# Patient Record
Sex: MALE | Race: AMERICAN INDIAN OR ALASKA NATIVE | NOT HISPANIC OR LATINO | Employment: UNEMPLOYED | URBAN - METROPOLITAN AREA
[De-identification: names, ages, dates, MRNs, and addresses within clinical notes are randomized per-mention and may not be internally consistent; named-entity substitution may affect disease eponyms.]

---

## 2022-10-10 ENCOUNTER — MEDICAL CORRESPONDENCE (OUTPATIENT)
Dept: HEALTH INFORMATION MANAGEMENT | Facility: CLINIC | Age: 67
End: 2022-10-10

## 2022-10-10 ENCOUNTER — TRANSFERRED RECORDS (OUTPATIENT)
Dept: HEALTH INFORMATION MANAGEMENT | Facility: CLINIC | Age: 67
End: 2022-10-10

## 2022-10-13 ENCOUNTER — TRANSFERRED RECORDS (OUTPATIENT)
Dept: HEALTH INFORMATION MANAGEMENT | Facility: CLINIC | Age: 67
End: 2022-10-13

## 2022-10-21 ENCOUNTER — TRANSCRIBE ORDERS (OUTPATIENT)
Dept: OTHER | Age: 67
End: 2022-10-21

## 2022-10-21 DIAGNOSIS — M25.552 HIP PAIN, LEFT: Primary | ICD-10-CM

## 2023-09-27 RX ORDER — GABAPENTIN 100 MG/1
100 CAPSULE ORAL 3 TIMES DAILY
Status: ON HOLD | COMMUNITY
End: 2023-10-02

## 2023-10-02 ENCOUNTER — HOSPITAL ENCOUNTER (INPATIENT)
Facility: CLINIC | Age: 68
LOS: 4 days | Discharge: HOME HEALTH | DRG: 471 | End: 2023-10-06
Attending: ORTHOPAEDIC SURGERY | Admitting: ORTHOPAEDIC SURGERY
Payer: COMMERCIAL

## 2023-10-02 ENCOUNTER — APPOINTMENT (OUTPATIENT)
Dept: RADIOLOGY | Facility: CLINIC | Age: 68
DRG: 471 | End: 2023-10-02
Attending: ORTHOPAEDIC SURGERY
Payer: COMMERCIAL

## 2023-10-02 ENCOUNTER — ANESTHESIA EVENT (OUTPATIENT)
Dept: SURGERY | Facility: CLINIC | Age: 68
DRG: 471 | End: 2023-10-02
Payer: COMMERCIAL

## 2023-10-02 ENCOUNTER — APPOINTMENT (OUTPATIENT)
Dept: PHYSICAL THERAPY | Facility: CLINIC | Age: 68
DRG: 471 | End: 2023-10-02
Attending: PHYSICIAN ASSISTANT
Payer: COMMERCIAL

## 2023-10-02 ENCOUNTER — ANESTHESIA (OUTPATIENT)
Dept: SURGERY | Facility: CLINIC | Age: 68
DRG: 471 | End: 2023-10-02
Payer: COMMERCIAL

## 2023-10-02 DIAGNOSIS — J69.0 ASPIRATION PNEUMONIA OF LEFT LOWER LOBE, UNSPECIFIED ASPIRATION PNEUMONIA TYPE (H): ICD-10-CM

## 2023-10-02 DIAGNOSIS — I10 PRIMARY HYPERTENSION: ICD-10-CM

## 2023-10-02 DIAGNOSIS — M48.061 SPINAL STENOSIS OF LUMBAR REGION, UNSPECIFIED WHETHER NEUROGENIC CLAUDICATION PRESENT: Primary | ICD-10-CM

## 2023-10-02 PROBLEM — F10.90 ALCOHOL USE, UNSPECIFIED, UNCOMPLICATED: Status: ACTIVE | Noted: 2023-10-02

## 2023-10-02 LAB
ALBUMIN SERPL BCG-MCNC: 4.1 G/DL (ref 3.5–5.2)
ALP SERPL-CCNC: 91 U/L (ref 40–129)
ALT SERPL W P-5'-P-CCNC: 12 U/L (ref 0–70)
ANION GAP SERPL CALCULATED.3IONS-SCNC: 8 MMOL/L (ref 7–15)
AST SERPL W P-5'-P-CCNC: 27 U/L (ref 0–45)
BILIRUB DIRECT SERPL-MCNC: <0.2 MG/DL (ref 0–0.3)
BILIRUB SERPL-MCNC: 0.2 MG/DL
BUN SERPL-MCNC: 16.3 MG/DL (ref 8–23)
CALCIUM SERPL-MCNC: 8.4 MG/DL (ref 8.8–10.2)
CHLORIDE SERPL-SCNC: 103 MMOL/L (ref 98–107)
CREAT SERPL-MCNC: 0.72 MG/DL (ref 0.67–1.17)
DEPRECATED HCO3 PLAS-SCNC: 28 MMOL/L (ref 22–29)
EGFRCR SERPLBLD CKD-EPI 2021: >90 ML/MIN/1.73M2
GLUCOSE SERPL-MCNC: 159 MG/DL (ref 70–99)
POTASSIUM SERPL-SCNC: 4.1 MMOL/L (ref 3.4–5.3)
PROT SERPL-MCNC: 7.4 G/DL (ref 6.4–8.3)
SODIUM SERPL-SCNC: 139 MMOL/L (ref 135–145)
TSH SERPL DL<=0.005 MIU/L-ACNC: 3.29 UIU/ML (ref 0.3–4.2)

## 2023-10-02 PROCEDURE — 250N000013 HC RX MED GY IP 250 OP 250 PS 637: Performed by: FAMILY MEDICINE

## 2023-10-02 PROCEDURE — 84443 ASSAY THYROID STIM HORMONE: CPT | Performed by: FAMILY MEDICINE

## 2023-10-02 PROCEDURE — 258N000003 HC RX IP 258 OP 636: Performed by: ANESTHESIOLOGY

## 2023-10-02 PROCEDURE — 82248 BILIRUBIN DIRECT: CPT | Performed by: FAMILY MEDICINE

## 2023-10-02 PROCEDURE — 272N000001 HC OR GENERAL SUPPLY STERILE: Performed by: ORTHOPAEDIC SURGERY

## 2023-10-02 PROCEDURE — 250N000009 HC RX 250: Performed by: ANESTHESIOLOGY

## 2023-10-02 PROCEDURE — 0RG20A0 FUSION OF 2 OR MORE CERVICAL VERTEBRAL JOINTS WITH INTERBODY FUSION DEVICE, ANTERIOR APPROACH, ANTERIOR COLUMN, OPEN APPROACH: ICD-10-PCS | Performed by: ORTHOPAEDIC SURGERY

## 2023-10-02 PROCEDURE — 80053 COMPREHEN METABOLIC PANEL: CPT | Performed by: FAMILY MEDICINE

## 2023-10-02 PROCEDURE — 97530 THERAPEUTIC ACTIVITIES: CPT | Mod: GP

## 2023-10-02 PROCEDURE — 258N000003 HC RX IP 258 OP 636: Performed by: PHYSICIAN ASSISTANT

## 2023-10-02 PROCEDURE — 250N000011 HC RX IP 250 OP 636: Performed by: ANESTHESIOLOGY

## 2023-10-02 PROCEDURE — 999N000182 XR SURGERY CARM FLUORO GREATER THAN 5 MIN: Mod: TC

## 2023-10-02 PROCEDURE — 97161 PT EVAL LOW COMPLEX 20 MIN: CPT | Mod: GP

## 2023-10-02 PROCEDURE — 97116 GAIT TRAINING THERAPY: CPT | Mod: GP

## 2023-10-02 PROCEDURE — 250N000011 HC RX IP 250 OP 636: Mod: JZ | Performed by: PHYSICIAN ASSISTANT

## 2023-10-02 PROCEDURE — 36415 COLL VENOUS BLD VENIPUNCTURE: CPT | Performed by: FAMILY MEDICINE

## 2023-10-02 PROCEDURE — 250N000013 HC RX MED GY IP 250 OP 250 PS 637: Performed by: ANESTHESIOLOGY

## 2023-10-02 PROCEDURE — 99222 1ST HOSP IP/OBS MODERATE 55: CPT | Performed by: FAMILY MEDICINE

## 2023-10-02 PROCEDURE — C1713 ANCHOR/SCREW BN/BN,TIS/BN: HCPCS | Performed by: ORTHOPAEDIC SURGERY

## 2023-10-02 PROCEDURE — 360N000085 HC SURGERY LEVEL 5 W/ FLUORO, PER MIN: Performed by: ORTHOPAEDIC SURGERY

## 2023-10-02 PROCEDURE — 01N10ZZ RELEASE CERVICAL NERVE, OPEN APPROACH: ICD-10-PCS | Performed by: ORTHOPAEDIC SURGERY

## 2023-10-02 PROCEDURE — C1762 CONN TISS, HUMAN(INC FASCIA): HCPCS | Performed by: ORTHOPAEDIC SURGERY

## 2023-10-02 PROCEDURE — 120N000004 HC R&B MS OVERFLOW

## 2023-10-02 PROCEDURE — 250N000009 HC RX 250: Performed by: ORTHOPAEDIC SURGERY

## 2023-10-02 PROCEDURE — 250N000011 HC RX IP 250 OP 636: Performed by: PHYSICIAN ASSISTANT

## 2023-10-02 PROCEDURE — 250N000013 HC RX MED GY IP 250 OP 250 PS 637: Performed by: PHYSICIAN ASSISTANT

## 2023-10-02 PROCEDURE — 370N000017 HC ANESTHESIA TECHNICAL FEE, PER MIN: Performed by: ORTHOPAEDIC SURGERY

## 2023-10-02 PROCEDURE — 999N000141 HC STATISTIC PRE-PROCEDURE NURSING ASSESSMENT: Performed by: ORTHOPAEDIC SURGERY

## 2023-10-02 PROCEDURE — 00NW0ZZ RELEASE CERVICAL SPINAL CORD, OPEN APPROACH: ICD-10-PCS | Performed by: ORTHOPAEDIC SURGERY

## 2023-10-02 PROCEDURE — 250N000025 HC SEVOFLURANE, PER MIN: Performed by: ORTHOPAEDIC SURGERY

## 2023-10-02 PROCEDURE — 0RT30ZZ RESECTION OF CERVICAL VERTEBRAL DISC, OPEN APPROACH: ICD-10-PCS | Performed by: ORTHOPAEDIC SURGERY

## 2023-10-02 PROCEDURE — 710N000010 HC RECOVERY PHASE 1, LEVEL 2, PER MIN: Performed by: ORTHOPAEDIC SURGERY

## 2023-10-02 DEVICE — IMPLANTABLE DEVICE: Type: IMPLANTABLE DEVICE | Site: SPINE CERVICAL | Status: FUNCTIONAL

## 2023-10-02 DEVICE — ROI-C ANCHORING PLATE
Type: IMPLANTABLE DEVICE | Site: SPINE CERVICAL | Status: FUNCTIONAL
Brand: ROI-C

## 2023-10-02 DEVICE — GRAFT ALLOGRAFT ARTHREX ALLOSYNC DBM PASTE 1ML ABS-2015-01: Type: IMPLANTABLE DEVICE | Site: SPINE CERVICAL | Status: FUNCTIONAL

## 2023-10-02 DEVICE — STERILE SHORT STARTER AWL FOR ROI-C ANCHORING PLATE
Type: IMPLANTABLE DEVICE | Site: SPINE CERVICAL | Status: FUNCTIONAL
Brand: ROI-C

## 2023-10-02 RX ORDER — FENTANYL CITRATE 50 UG/ML
INJECTION, SOLUTION INTRAMUSCULAR; INTRAVENOUS PRN
Status: DISCONTINUED | OUTPATIENT
Start: 2023-10-02 | End: 2023-10-02

## 2023-10-02 RX ORDER — HYDROMORPHONE HCL IN WATER/PF 6 MG/30 ML
0.2 PATIENT CONTROLLED ANALGESIA SYRINGE INTRAVENOUS EVERY 5 MIN PRN
Status: DISCONTINUED | OUTPATIENT
Start: 2023-10-02 | End: 2023-10-02 | Stop reason: HOSPADM

## 2023-10-02 RX ORDER — SODIUM CHLORIDE, SODIUM LACTATE, POTASSIUM CHLORIDE, CALCIUM CHLORIDE 600; 310; 30; 20 MG/100ML; MG/100ML; MG/100ML; MG/100ML
INJECTION, SOLUTION INTRAVENOUS CONTINUOUS
Status: DISCONTINUED | OUTPATIENT
Start: 2023-10-02 | End: 2023-10-02 | Stop reason: HOSPADM

## 2023-10-02 RX ORDER — CEFAZOLIN SODIUM/WATER 2 G/20 ML
2 SYRINGE (ML) INTRAVENOUS SEE ADMIN INSTRUCTIONS
Status: DISCONTINUED | OUTPATIENT
Start: 2023-10-02 | End: 2023-10-02 | Stop reason: HOSPADM

## 2023-10-02 RX ORDER — SODIUM CHLORIDE 9 MG/ML
INJECTION, SOLUTION INTRAVENOUS CONTINUOUS
Status: DISCONTINUED | OUTPATIENT
Start: 2023-10-02 | End: 2023-10-03

## 2023-10-02 RX ORDER — PROPOFOL 10 MG/ML
INJECTION, EMULSION INTRAVENOUS PRN
Status: DISCONTINUED | OUTPATIENT
Start: 2023-10-02 | End: 2023-10-02

## 2023-10-02 RX ORDER — LABETALOL HYDROCHLORIDE 5 MG/ML
10 INJECTION, SOLUTION INTRAVENOUS
Status: COMPLETED | OUTPATIENT
Start: 2023-10-02 | End: 2023-10-02

## 2023-10-02 RX ORDER — CEFAZOLIN SODIUM/WATER 2 G/20 ML
2 SYRINGE (ML) INTRAVENOUS
Status: COMPLETED | OUTPATIENT
Start: 2023-10-02 | End: 2023-10-02

## 2023-10-02 RX ORDER — ONDANSETRON 2 MG/ML
4 INJECTION INTRAMUSCULAR; INTRAVENOUS EVERY 6 HOURS PRN
Status: DISCONTINUED | OUTPATIENT
Start: 2023-10-02 | End: 2023-10-02

## 2023-10-02 RX ORDER — GABAPENTIN 100 MG/1
100 CAPSULE ORAL
Status: COMPLETED | OUTPATIENT
Start: 2023-10-02 | End: 2023-10-02

## 2023-10-02 RX ORDER — LIDOCAINE 40 MG/G
CREAM TOPICAL
Status: DISCONTINUED | OUTPATIENT
Start: 2023-10-02 | End: 2023-10-02 | Stop reason: HOSPADM

## 2023-10-02 RX ORDER — NALOXONE HYDROCHLORIDE 0.4 MG/ML
0.4 INJECTION, SOLUTION INTRAMUSCULAR; INTRAVENOUS; SUBCUTANEOUS
Status: DISCONTINUED | OUTPATIENT
Start: 2023-10-02 | End: 2023-10-06 | Stop reason: HOSPADM

## 2023-10-02 RX ORDER — HYDROMORPHONE HCL IN WATER/PF 6 MG/30 ML
0.2 PATIENT CONTROLLED ANALGESIA SYRINGE INTRAVENOUS
Status: DISCONTINUED | OUTPATIENT
Start: 2023-10-02 | End: 2023-10-06 | Stop reason: HOSPADM

## 2023-10-02 RX ORDER — ACETAMINOPHEN 325 MG/1
650 TABLET ORAL EVERY 4 HOURS PRN
Status: DISCONTINUED | OUTPATIENT
Start: 2023-10-05 | End: 2023-10-06 | Stop reason: HOSPADM

## 2023-10-02 RX ORDER — NALOXONE HYDROCHLORIDE 0.4 MG/ML
0.2 INJECTION, SOLUTION INTRAMUSCULAR; INTRAVENOUS; SUBCUTANEOUS
Status: DISCONTINUED | OUTPATIENT
Start: 2023-10-02 | End: 2023-10-06 | Stop reason: HOSPADM

## 2023-10-02 RX ORDER — ONDANSETRON 4 MG/1
4 TABLET, ORALLY DISINTEGRATING ORAL EVERY 30 MIN PRN
Status: DISCONTINUED | OUTPATIENT
Start: 2023-10-02 | End: 2023-10-06 | Stop reason: HOSPADM

## 2023-10-02 RX ORDER — CEFAZOLIN SODIUM 2 G/100ML
2 INJECTION, SOLUTION INTRAVENOUS EVERY 8 HOURS
Status: COMPLETED | OUTPATIENT
Start: 2023-10-02 | End: 2023-10-03

## 2023-10-02 RX ORDER — LORATADINE 10 MG/1
10 TABLET ORAL DAILY PRN
Status: DISCONTINUED | OUTPATIENT
Start: 2023-10-02 | End: 2023-10-06 | Stop reason: HOSPADM

## 2023-10-02 RX ORDER — ACETAMINOPHEN 325 MG/1
975 TABLET ORAL EVERY 8 HOURS
Status: COMPLETED | OUTPATIENT
Start: 2023-10-02 | End: 2023-10-05

## 2023-10-02 RX ORDER — BISACODYL 10 MG
10 SUPPOSITORY, RECTAL RECTAL DAILY PRN
Status: DISCONTINUED | OUTPATIENT
Start: 2023-10-02 | End: 2023-10-06 | Stop reason: HOSPADM

## 2023-10-02 RX ORDER — POLYETHYLENE GLYCOL 3350 17 G/17G
17 POWDER, FOR SOLUTION ORAL DAILY
Status: DISCONTINUED | OUTPATIENT
Start: 2023-10-03 | End: 2023-10-06 | Stop reason: HOSPADM

## 2023-10-02 RX ORDER — ONDANSETRON 4 MG/1
4 TABLET, ORALLY DISINTEGRATING ORAL EVERY 6 HOURS PRN
Status: DISCONTINUED | OUTPATIENT
Start: 2023-10-02 | End: 2023-10-02

## 2023-10-02 RX ORDER — PROCHLORPERAZINE MALEATE 5 MG
5 TABLET ORAL EVERY 6 HOURS PRN
Status: DISCONTINUED | OUTPATIENT
Start: 2023-10-02 | End: 2023-10-06 | Stop reason: HOSPADM

## 2023-10-02 RX ORDER — HYDROMORPHONE HCL IN WATER/PF 6 MG/30 ML
0.4 PATIENT CONTROLLED ANALGESIA SYRINGE INTRAVENOUS EVERY 5 MIN PRN
Status: DISCONTINUED | OUTPATIENT
Start: 2023-10-02 | End: 2023-10-02 | Stop reason: HOSPADM

## 2023-10-02 RX ORDER — HYDROXYZINE HYDROCHLORIDE 10 MG/1
10 TABLET, FILM COATED ORAL EVERY 6 HOURS PRN
Status: DISCONTINUED | OUTPATIENT
Start: 2023-10-02 | End: 2023-10-06 | Stop reason: HOSPADM

## 2023-10-02 RX ORDER — DIMENHYDRINATE 50 MG/ML
25 INJECTION, SOLUTION INTRAMUSCULAR; INTRAVENOUS
Status: DISCONTINUED | OUTPATIENT
Start: 2023-10-02 | End: 2023-10-02 | Stop reason: HOSPADM

## 2023-10-02 RX ORDER — DIPHENHYDRAMINE HCL 12.5 MG/5ML
12.5 SOLUTION ORAL EVERY 6 HOURS PRN
Status: DISCONTINUED | OUTPATIENT
Start: 2023-10-02 | End: 2023-10-02 | Stop reason: HOSPADM

## 2023-10-02 RX ORDER — ONDANSETRON 2 MG/ML
4 INJECTION INTRAMUSCULAR; INTRAVENOUS EVERY 30 MIN PRN
Status: DISCONTINUED | OUTPATIENT
Start: 2023-10-02 | End: 2023-10-02 | Stop reason: HOSPADM

## 2023-10-02 RX ORDER — ONDANSETRON 2 MG/ML
4 INJECTION INTRAMUSCULAR; INTRAVENOUS EVERY 30 MIN PRN
Status: DISCONTINUED | OUTPATIENT
Start: 2023-10-02 | End: 2023-10-06 | Stop reason: HOSPADM

## 2023-10-02 RX ORDER — LIDOCAINE HYDROCHLORIDE 10 MG/ML
INJECTION, SOLUTION INFILTRATION; PERINEURAL PRN
Status: DISCONTINUED | OUTPATIENT
Start: 2023-10-02 | End: 2023-10-02

## 2023-10-02 RX ORDER — AMOXICILLIN 250 MG
1 CAPSULE ORAL 2 TIMES DAILY
Status: DISCONTINUED | OUTPATIENT
Start: 2023-10-02 | End: 2023-10-06 | Stop reason: HOSPADM

## 2023-10-02 RX ORDER — FENTANYL CITRATE 50 UG/ML
50 INJECTION, SOLUTION INTRAMUSCULAR; INTRAVENOUS EVERY 5 MIN PRN
Status: DISCONTINUED | OUTPATIENT
Start: 2023-10-02 | End: 2023-10-02 | Stop reason: HOSPADM

## 2023-10-02 RX ORDER — HALOPERIDOL 5 MG/ML
1 INJECTION INTRAMUSCULAR
Status: DISCONTINUED | OUTPATIENT
Start: 2023-10-02 | End: 2023-10-02 | Stop reason: HOSPADM

## 2023-10-02 RX ORDER — OXYCODONE HYDROCHLORIDE 5 MG/1
5 TABLET ORAL
Status: DISCONTINUED | OUTPATIENT
Start: 2023-10-02 | End: 2023-10-06 | Stop reason: HOSPADM

## 2023-10-02 RX ORDER — LISINOPRIL 5 MG/1
5 TABLET ORAL DAILY
Status: DISCONTINUED | OUTPATIENT
Start: 2023-10-02 | End: 2023-10-06 | Stop reason: HOSPADM

## 2023-10-02 RX ORDER — KETAMINE HYDROCHLORIDE 10 MG/ML
INJECTION INTRAMUSCULAR; INTRAVENOUS PRN
Status: DISCONTINUED | OUTPATIENT
Start: 2023-10-02 | End: 2023-10-02

## 2023-10-02 RX ORDER — MULTIVITAMIN,THERAPEUTIC
1 TABLET ORAL DAILY
Status: DISCONTINUED | OUTPATIENT
Start: 2023-10-03 | End: 2023-10-06 | Stop reason: HOSPADM

## 2023-10-02 RX ORDER — FENTANYL CITRATE-0.9 % NACL/PF 10 MCG/ML
PLASTIC BAG, INJECTION (ML) INTRAVENOUS CONTINUOUS PRN
Status: DISCONTINUED | OUTPATIENT
Start: 2023-10-02 | End: 2023-10-02

## 2023-10-02 RX ORDER — OXYCODONE HYDROCHLORIDE 5 MG/1
5 TABLET ORAL EVERY 4 HOURS PRN
Status: DISCONTINUED | OUTPATIENT
Start: 2023-10-02 | End: 2023-10-06 | Stop reason: HOSPADM

## 2023-10-02 RX ORDER — HYDROMORPHONE HCL IN WATER/PF 6 MG/30 ML
0.4 PATIENT CONTROLLED ANALGESIA SYRINGE INTRAVENOUS
Status: DISCONTINUED | OUTPATIENT
Start: 2023-10-02 | End: 2023-10-06 | Stop reason: HOSPADM

## 2023-10-02 RX ORDER — DEXAMETHASONE SODIUM PHOSPHATE 10 MG/ML
INJECTION, SOLUTION INTRAMUSCULAR; INTRAVENOUS PRN
Status: DISCONTINUED | OUTPATIENT
Start: 2023-10-02 | End: 2023-10-02

## 2023-10-02 RX ORDER — GABAPENTIN 100 MG/1
100 CAPSULE ORAL AT BEDTIME
Status: DISCONTINUED | OUTPATIENT
Start: 2023-10-02 | End: 2023-10-06 | Stop reason: HOSPADM

## 2023-10-02 RX ORDER — HYDRALAZINE HYDROCHLORIDE 20 MG/ML
10 INJECTION INTRAMUSCULAR; INTRAVENOUS EVERY 6 HOURS PRN
Status: DISCONTINUED | OUTPATIENT
Start: 2023-10-02 | End: 2023-10-06 | Stop reason: HOSPADM

## 2023-10-02 RX ORDER — ONDANSETRON 4 MG/1
4 TABLET, ORALLY DISINTEGRATING ORAL EVERY 30 MIN PRN
Status: DISCONTINUED | OUTPATIENT
Start: 2023-10-02 | End: 2023-10-02 | Stop reason: HOSPADM

## 2023-10-02 RX ORDER — GLYCOPYRROLATE 0.2 MG/ML
INJECTION, SOLUTION INTRAMUSCULAR; INTRAVENOUS PRN
Status: DISCONTINUED | OUTPATIENT
Start: 2023-10-02 | End: 2023-10-02

## 2023-10-02 RX ORDER — OXYCODONE HYDROCHLORIDE 5 MG/1
10 TABLET ORAL
Status: COMPLETED | OUTPATIENT
Start: 2023-10-02 | End: 2023-10-05

## 2023-10-02 RX ORDER — MAGNESIUM SULFATE 4 G/50ML
4 INJECTION INTRAVENOUS ONCE
Status: COMPLETED | OUTPATIENT
Start: 2023-10-02 | End: 2023-10-02

## 2023-10-02 RX ORDER — FENTANYL CITRATE 50 UG/ML
25 INJECTION, SOLUTION INTRAMUSCULAR; INTRAVENOUS EVERY 5 MIN PRN
Status: DISCONTINUED | OUTPATIENT
Start: 2023-10-02 | End: 2023-10-02 | Stop reason: HOSPADM

## 2023-10-02 RX ORDER — OXYCODONE HYDROCHLORIDE 5 MG/1
10 TABLET ORAL
Status: COMPLETED | OUTPATIENT
Start: 2023-10-02 | End: 2023-10-02

## 2023-10-02 RX ORDER — ONDANSETRON 2 MG/ML
INJECTION INTRAMUSCULAR; INTRAVENOUS PRN
Status: DISCONTINUED | OUTPATIENT
Start: 2023-10-02 | End: 2023-10-02

## 2023-10-02 RX ORDER — DIPHENHYDRAMINE HYDROCHLORIDE 50 MG/ML
12.5 INJECTION INTRAMUSCULAR; INTRAVENOUS EVERY 6 HOURS PRN
Status: DISCONTINUED | OUTPATIENT
Start: 2023-10-02 | End: 2023-10-02 | Stop reason: HOSPADM

## 2023-10-02 RX ORDER — OXYCODONE HYDROCHLORIDE 5 MG/1
10 TABLET ORAL EVERY 4 HOURS PRN
Status: DISCONTINUED | OUTPATIENT
Start: 2023-10-02 | End: 2023-10-06 | Stop reason: HOSPADM

## 2023-10-02 RX ADMIN — MAGNESIUM SULFATE HEPTAHYDRATE 4 G: 4 INJECTION, SOLUTION INTRAVENOUS at 08:29

## 2023-10-02 RX ADMIN — ROCURONIUM BROMIDE 50 MG: 10 INJECTION, SOLUTION INTRAVENOUS at 10:18

## 2023-10-02 RX ADMIN — ACETAMINOPHEN 975 MG: 325 TABLET ORAL at 22:51

## 2023-10-02 RX ADMIN — SUGAMMADEX 200 MG: 100 INJECTION, SOLUTION INTRAVENOUS at 11:49

## 2023-10-02 RX ADMIN — SENNOSIDES AND DOCUSATE SODIUM 1 TABLET: 50; 8.6 TABLET ORAL at 20:36

## 2023-10-02 RX ADMIN — ROCURONIUM BROMIDE 10 MG: 10 INJECTION, SOLUTION INTRAVENOUS at 11:30

## 2023-10-02 RX ADMIN — LIDOCAINE HYDROCHLORIDE 20 MG: 10 INJECTION, SOLUTION INFILTRATION; PERINEURAL at 10:18

## 2023-10-02 RX ADMIN — HYDROMORPHONE HYDROCHLORIDE 0.5 MG: 1 INJECTION, SOLUTION INTRAMUSCULAR; INTRAVENOUS; SUBCUTANEOUS at 11:15

## 2023-10-02 RX ADMIN — FENTANYL CITRATE 25 MCG: 50 INJECTION, SOLUTION INTRAMUSCULAR; INTRAVENOUS at 12:40

## 2023-10-02 RX ADMIN — ROCURONIUM BROMIDE 10 MG: 10 INJECTION, SOLUTION INTRAVENOUS at 10:31

## 2023-10-02 RX ADMIN — FENTANYL CITRATE 25 MCG: 50 INJECTION, SOLUTION INTRAMUSCULAR; INTRAVENOUS at 12:57

## 2023-10-02 RX ADMIN — OXYCODONE HYDROCHLORIDE 10 MG: 5 TABLET ORAL at 12:55

## 2023-10-02 RX ADMIN — HYDROXYZINE HYDROCHLORIDE 10 MG: 10 TABLET ORAL at 22:51

## 2023-10-02 RX ADMIN — PHENYLEPHRINE HYDROCHLORIDE 100 MCG: 10 INJECTION INTRAVENOUS at 10:31

## 2023-10-02 RX ADMIN — PROPOFOL 200 MG: 10 INJECTION, EMULSION INTRAVENOUS at 10:18

## 2023-10-02 RX ADMIN — OXYCODONE HYDROCHLORIDE 10 MG: 5 TABLET ORAL at 16:07

## 2023-10-02 RX ADMIN — ACETAMINOPHEN 975 MG: 325 TABLET ORAL at 14:15

## 2023-10-02 RX ADMIN — GABAPENTIN 100 MG: 100 CAPSULE ORAL at 07:50

## 2023-10-02 RX ADMIN — Medication 2 G: at 10:15

## 2023-10-02 RX ADMIN — LISINOPRIL 5 MG: 5 TABLET ORAL at 14:15

## 2023-10-02 RX ADMIN — HYDROXYZINE HYDROCHLORIDE 10 MG: 10 TABLET ORAL at 16:07

## 2023-10-02 RX ADMIN — CEFAZOLIN SODIUM 2 G: 2 INJECTION, SOLUTION INTRAVENOUS at 18:13

## 2023-10-02 RX ADMIN — MIDAZOLAM 2 MG: 1 INJECTION INTRAMUSCULAR; INTRAVENOUS at 10:15

## 2023-10-02 RX ADMIN — DEXAMETHASONE SODIUM PHOSPHATE 10 MG: 10 INJECTION, SOLUTION INTRAMUSCULAR; INTRAVENOUS at 10:18

## 2023-10-02 RX ADMIN — HYDROMORPHONE HYDROCHLORIDE 0.5 MG: 1 INJECTION, SOLUTION INTRAMUSCULAR; INTRAVENOUS; SUBCUTANEOUS at 10:37

## 2023-10-02 RX ADMIN — FENTANYL CITRATE 100 MCG: 50 INJECTION, SOLUTION INTRAMUSCULAR; INTRAVENOUS at 10:18

## 2023-10-02 RX ADMIN — SODIUM CHLORIDE, POTASSIUM CHLORIDE, SODIUM LACTATE AND CALCIUM CHLORIDE: 600; 310; 30; 20 INJECTION, SOLUTION INTRAVENOUS at 10:48

## 2023-10-02 RX ADMIN — PHENYLEPHRINE HYDROCHLORIDE 200 MCG: 10 INJECTION INTRAVENOUS at 10:52

## 2023-10-02 RX ADMIN — GLYCOPYRROLATE 0.2 MG: 0.2 INJECTION INTRAMUSCULAR; INTRAVENOUS at 10:23

## 2023-10-02 RX ADMIN — ROCURONIUM BROMIDE 10 MG: 10 INJECTION, SOLUTION INTRAVENOUS at 11:03

## 2023-10-02 RX ADMIN — SODIUM CHLORIDE, POTASSIUM CHLORIDE, SODIUM LACTATE AND CALCIUM CHLORIDE: 600; 310; 30; 20 INJECTION, SOLUTION INTRAVENOUS at 08:19

## 2023-10-02 RX ADMIN — Medication 20 MCG/MIN: at 10:46

## 2023-10-02 RX ADMIN — KETAMINE HYDROCHLORIDE 50 MG: 10 INJECTION INTRAMUSCULAR; INTRAVENOUS at 10:18

## 2023-10-02 RX ADMIN — FENTANYL CITRATE 25 MCG: 50 INJECTION, SOLUTION INTRAMUSCULAR; INTRAVENOUS at 12:49

## 2023-10-02 RX ADMIN — PHENYLEPHRINE HYDROCHLORIDE 200 MCG: 10 INJECTION INTRAVENOUS at 10:34

## 2023-10-02 RX ADMIN — GABAPENTIN 100 MG: 100 CAPSULE ORAL at 20:36

## 2023-10-02 RX ADMIN — OXYCODONE HYDROCHLORIDE 10 MG: 5 TABLET ORAL at 20:35

## 2023-10-02 RX ADMIN — FENTANYL CITRATE 25 MCG: 50 INJECTION, SOLUTION INTRAMUSCULAR; INTRAVENOUS at 13:07

## 2023-10-02 RX ADMIN — SODIUM CHLORIDE: 9 INJECTION, SOLUTION INTRAVENOUS at 14:12

## 2023-10-02 RX ADMIN — ONDANSETRON 4 MG: 2 INJECTION INTRAMUSCULAR; INTRAVENOUS at 11:41

## 2023-10-02 RX ADMIN — PHENYLEPHRINE HYDROCHLORIDE 100 MCG: 10 INJECTION INTRAVENOUS at 10:44

## 2023-10-02 ASSESSMENT — LIFESTYLE VARIABLES: TOBACCO_USE: 1

## 2023-10-02 ASSESSMENT — ACTIVITIES OF DAILY LIVING (ADL)
TOILETING_ISSUES: NO
WALKING_OR_CLIMBING_STAIRS_DIFFICULTY: NO
WEAR_GLASSES_OR_BLIND: YES
DOING_ERRANDS_INDEPENDENTLY_DIFFICULTY: NO
ADLS_ACUITY_SCORE: 21
ADLS_ACUITY_SCORE: 21
FALL_HISTORY_WITHIN_LAST_SIX_MONTHS: NO
DIFFICULTY_EATING/SWALLOWING: NO
CHANGE_IN_FUNCTIONAL_STATUS_SINCE_ONSET_OF_CURRENT_ILLNESS/INJURY: NO
CONCENTRATING,_REMEMBERING_OR_MAKING_DECISIONS_DIFFICULTY: NO
ADLS_ACUITY_SCORE: 20
VISION_MANAGEMENT: GLASSES
ADLS_ACUITY_SCORE: 21
ADLS_ACUITY_SCORE: 35
EQUIPMENT_CURRENTLY_USED_AT_HOME: CANE, STRAIGHT;WALKER, STANDARD
DRESSING/BATHING_DIFFICULTY: NO
ADLS_ACUITY_SCORE: 35
ADLS_ACUITY_SCORE: 21
ADLS_ACUITY_SCORE: 35

## 2023-10-02 NOTE — PHARMACY-ADMISSION MEDICATION HISTORY
Pharmacist Admission Medication History    Admission medication history is complete. The information provided in this note is only as accurate as the sources available at the time of the update.    Medication reconciliation/reorder completed by provider prior to medication history? No    Information Source(s): Patient via in-person    Pertinent Information:     Changes made to PTA medication list:  Added: None  Deleted: gabapentin  Changed: None    Medication Affordability:       Allergies reviewed with patient and updates made in EHR: yes    Medication History Completed By: Kayla Aleman RPH 10/2/2023 7:51 AM    Prior to Admission medications    Not on File

## 2023-10-02 NOTE — CONSULTS
[facility] MEDICINE CONSULT NOTE   Physician requesting consult: Tremaine Cruz MD    Reason for consult: Postoperative medical management of medical co-morbidities as below    Identification/Summary:   Varun Crowder is a 68 year old male with a PMH of cervical disc disease and some alcohol overuse otherwise generally healthy.  At his preop blood pressures were elevated.  Recheck of a slightly improved.  Denies any prior history of high blood pressure or being on an antihypertensive.  Was cleared for surgery.  Underwent cervical fusion.  Pre and postoperatively patient has been quite hypertensive.  Hospitalist service consulted.     Assessment and Plan:    Status post cervical fusion  Postoperative management per surgery.  As best I can determine no preoperative laboratory work was done.  Hypertension  Blood pressure pre and postoperatively has consistently been elevated.  Highest reading 183/112.  Preoperative blood pressure was 172/100.  Not chronically on any medications.  Denies a prior history of blood pressure or having recommendations for taking medications for this.  [place] lisinopril 5 mg daily.  Monitor response.  We will also have hydralazine available for severe elevations.  Check a BMP, TSH and LFTs.  Alcohol overuse  Regularly drinks 0-8 beverages per day.  Noted.  Monitor for withdrawal.       Anticoagulation.  SCDs ordered by surgery.  Standard postsurgical risk.  COVID-19 PCR unknown  Nurses to screen.  Fluids: Per surgery  Pain meds: Per surgery  Therapy: Per surgery  Martinez:Not present  Lines: None       Current Diet  Orders Placed This Encounter      Advance Diet as Tolerated: Clear Liquid Diet      Discharge Instruction - Regular Diet Adult    Supplements  None      Disposition  Per surgery    Code status:Full Code   [facility] service was asked to evaluate patient for postoperative medical management as follows below. Please resume the home medications as reconciled  and further noted with ordered hold parameters.  Thank you for this consult; we will continue to follow this patient until discharge.    Procedure(s):  BILATERAL CERVICAL 3 - CERVICAL 4 AND CERVICAL 4 - CERVICAL 5 ANTERIOR CERVICAL DECOMPRESSION FUSION  Day of Surgery  Estimated Blood Loss:  50 mL  Hospital Problem List   No problem-specific Assessment & Plan notes found for this encounter.    Principal Problem:    Lumbar spinal stenosis      -Reviewed the patient's preoperative H and P and updated missing elements.  -Home medication reconciliation has been reviewed.  Medications have been ordered as noted from the home list and changes are documented above     Clinically Significant Risk Factors Present on Admission                    # Acute Respiratory Failure: Documented O2 saturation < 91%.  Continue supplemental oxygen as needed     # Obesity: Estimated body mass index is 34.95 kg/m  as calculated from the following:    Height as of this encounter: 1.829 m (6').    Weight as of this encounter: 116.9 kg (257 lb 11.2 oz).              HISTORY     Varun Crowder is a 68 year old male with PMH of  cervical disc disease and some alcohol overuse otherwise generally healthy.  At his preop blood pressures were elevated.  Recheck of a slightly improved.  Denies any prior history of high blood pressure or being on an antihypertensive.  Was cleared for surgery.  Underwent cervical fusion.  Pre and postoperatively patient has been quite hypertensive.  Hospitalist service consulted.  Denies any chest pain shortness of breath nausea or vomiting.  Agreeable to starting lisinopril.  Denies personal history of heart attack, stroke, cancer, diabetes, DVT, PE, peptic ulcer disease or sleep apnea.  .  Questions answered to verbalized satisfaction.    Past Medical History     No past medical history on file.     Patient Active Problem List    Diagnosis Date Noted     Lumbar spinal stenosis 10/02/2023     Priority: Medium      Surgical History     Past Surgical History:   Procedure Laterality Date     ORTHOPEDIC SURGERY Right     Shoulder tendon repair     Family History    History reviewed. No pertinent family history.   Social History      Social History     Tobacco Use     Smoking status: Former     Types: Cigarettes     Smokeless tobacco: Never   Substance Use Topics     Alcohol use: Yes     Comment: 3 times a week, few beers      Allergies   No Known Allergies    Prior to Admission Medications      None      Review of Systems     A 12 point comprehensive review of systems was negative except as noted above in HPI.    OBJECTIVE         Physical Exam   Temp:  [97.8  F (36.6  C)-98.3  F (36.8  C)] 98  F (36.7  C)  Pulse:  [92-98] 92  Resp:  [16-24] 24  BP: (147-194)/() 181/96  SpO2:  [84 %-95 %] 94 %  Body mass index is 34.95 kg/m .  Constitutional: awake, alert, cooperative, no apparent distress, and appears stated age  Eyes: Lids and lashes normal, pupils equal, round and reactive to light, extra ocular muscles intact, sclera clear, conjunctiva normal  ENT: Surgical dressing over left anterior neck.  Normocephalic, without obvious abnormality, atraumatic, sinuses nontender on palpation, external ears without lesions, oral pharynx with moist mucous membranes, tonsils without erythema or exudates, gums normal and good dentition.  Hematologic / Lymphatic: no cervical lymphadenopathy and no supraclavicular lymphadenopathy  Respiratory: No increased work of breathing, good air exchange, clear to auscultation bilaterally, no crackles or wheezing  Cardiovascular: Normal apical impulse, regular rate and rhythm, normal S1 and S2, no S3 or S4, and no murmur noted  GI: No scars, normal bowel sounds, soft, non-distended, non-tender, no masses palpated, no hepatosplenomegally  Skin: normal skin color, texture, turgor, no redness, warmth, or swelling, and no rashes  Musculoskeletal: There is no redness, warmth, or swelling of the joints.   Full range of motion noted.  Motor strength is 5 out of 5 all extremities bilaterally.  Tone is normal. no lower extremity pitting edema present  Neurologic: Cranial nerves II-XII are grossly intact. Sensory:  Sensory intact  Neuropsychiatric: General: normal, calm, and normal eye contact Level of consciousness: alert / normal Affect: normal Orientation: oriented to self, place, time and situation Memory and insight: normal, memory for past and recent events intact, and thought process normal      Cardiographics Reviewed Personally By Myself     EKG Results:  Not performed at preop      Imaging Reviewed Personally By Myself      Radiology Results:   Recent Results (from the past 24 hour(s))   XR Surgery DIALLO  Fluoro G/T 5 Min    Narrative    This exam was marked as non-reportable because it will not be read by a   radiologist or a [place] non-radiologist provider.             Labs Reviewed Personally By Myself     Results for orders placed or performed during the hospital encounter of 10/02/23 (from the past 24 hour(s))   XR Surgery DIALLO  Fluoro G/T 5 Min    Narrative    This exam was marked as non-reportable because it will not be read by a   radiologist or a [place] non-radiologist provider.             Preoperative Labs Reviewed Personally By Myself         As best I can determine no preoperative laboratory work was done.    Thank you for this consultation.  Appreciate the opportunity to participate in the care of Varun Crowder, please feel free to contact us for any questions or concerns.    Mulugeta Fernandez MD  [facility] [place]  Phone: #[phone]

## 2023-10-02 NOTE — ANESTHESIA PROCEDURE NOTES
Airway       Patient location during procedure: OR       Procedure Start/Stop Times: 10/2/2023 10:21 AM  Staff -        Anesthesiologist:  Zana Garcia MD       CRNA: Norm Jackson APRN CRNA       Performed By: CRNAIndications and Patient Condition       Indications for airway management: emilee-procedural       Induction type:intravenous       Mask difficulty assessment: 2 - vent by mask + OA or adjuvant +/- NMBA    Final Airway Details       Final airway type: endotracheal airway       Successful airway: ETT - single  Endotracheal Airway Details        ETT size (mm): 8.0       Cuffed: yes       Successful intubation technique: video laryngoscopy       VL Blade Size: Glidescope 4       Grade View of Cords: 1       Adjucts: stylet       Position: Center       Measured from: lips       Secured at (cm): 23       Bite block used: Soft    Post intubation assessment        Placement verified by: capnometry, equal breath sounds and chest rise        Number of attempts at approach: 1       Number of other approaches attempted: 0       Secured with: silk tape       Ease of procedure: easy       Dentition: Intact and Unchanged    Medication(s) Administered   Medication Administration Time: 10/2/2023 10:21 AM

## 2023-10-02 NOTE — PROGRESS NOTES
Attempted to call and update wife Micah but phone kept ringing with no answer or voicemail available.    1429 - Bernabedelfina called and updated on patient informed that he is in a hospital room an they can visit until 8:30pm

## 2023-10-02 NOTE — PROGRESS NOTES
10/02/23 1600   Appointment Info   Signing Clinician's Name / Credentials (PT) Vidhi Peña, PT, DPT   Living Environment   People in Home child(kristine), adult;spouse   Current Living Arrangements house   Home Accessibility stairs to enter home   Number of Stairs, Main Entrance 3   Stair Railings, Main Entrance railing on left side (ascending)   Self-Care   Equipment Currently Used at Home cane, straight;walker, rolling   Fall history within last six months no  (Had one fall 3 years ago which has led to multiple surgeries)   General Information   Onset of Illness/Injury or Date of Surgery 10/02/23   Referring Physician Tremaine Cruz MD   Patient/Family Therapy Goals Statement (PT) Return to home   Pertinent History of Current Problem (include personal factors and/or comorbidities that impact the POC) s/p C3-4, C4-5 decompression fusion   Existing Precautions/Restrictions other (see comments)  (Avoid bending/lifitng/twisting, brace for comfort)   Bed Mobility   Bed Mobility supine-sit   Supine-Sit [place] (Bed Mobility) minimum assist (75% patient effort)   Transfers   Transfers sit-stand transfer   Maintains Weight-bearing Status (Transfers) able to maintain   Transfer Safety Concerns Noted decreased step length   Impairments Contributing to Impaired Transfers pain;decreased ROM;decreased strength   Sit-Stand Transfer   Sit-Stand [place] (Transfers) minimum assist (75% patient effort);supervision;verbal cues   Assistive Device (Sit-Stand Transfers) walker, front-wheeled   Gait/Stairs (Locomotion)   [place] Level (Gait) minimum assist (75% patient effort)   Assistive Device (Gait) walker, front-wheeled   Distance in Feet 2   Distance in Feet (Gait) 15'   Pattern (Gait) step-through   Deviations/Abnormal Patterns (Gait) gait speed decreased   Maintains Weight-bearing Status (Gait) able to maintain   Clinical Impression   Criteria for Skilled Therapeutic Intervention Yes, treatment indicated   PT  Diagnosis (PT) impaired functional mobility   Influenced by the following impairments weakness, pain, decreased ROM   Functional limitations due to impairments gait, stairs, transfers   Clinical Presentation (PT Evaluation Complexity) Stable/Uncomplicated   Clinical Presentation Rationale pt presents as medically diagnosed   Clinical Decision Making (Complexity) low complexity   Planned Therapy Interventions (PT) gait training;home exercise program;strengthening;stair training;transfer training   Anticipated Equipment Needs at Discharge (PT) walker, rolling   Risk & Benefits of therapy have been explained evaluation/treatment results reviewed;patient   PT Total Evaluation Time   PT Eval, Low Complexity Minutes (05712) 10   Plan of Care Review   Plan of Care Reviewed With patient   Physical Therapy Goals   PT Frequency Daily   PT Predicted Duration/Target Date for Goal Attainment 10/03/23   PT Goals Transfers;Gait;Stairs;PT Goal 1   PT: Transfers Supervision/stand-by assist;Sit to/from stand;Assistive device;Within precautions   PT: Gait Supervision/stand-by assist;100 feet;Assistive device;Rolling walker;Within precautions   PT: Stairs Supervision/stand-by assist;3 stairs;Rail on right;Within precautions   PT: Goal 1 Pt will be mod I with HEP   Interventions   Interventions Quick Adds Gait Training;Therapeutic Activity;Therapeutic Procedure   Therapeutic Procedure/Exercise   Ther. Procedure: strength, endurance, ROM, flexibillity Minutes (64942) 5   Symptoms Noted During/After Treatment fatigue;increased pain   Treatment Detail/Skilled Intervention Shoulder circles forward & backwards, scapular retraction: 8-10 reps, mild increase in pain with forward circles leading to stopping the exercise, cueing for safety/form/pain management, SBA   Therapeutic Activity   Therapeutic Activities: dynamic activities to improve functional performance Minutes (82216) 8   Symptoms Noted During/After Treatment Fatigue;Increased  pain;Dizziness   Treatment Detail/Skilled Intervention supine to sit: cueing for safety/use of bed rail/technique, CGA Sitting EOB x~3 min: cueing for safety, increased time on EOB d/t feelings of dizziness which resolved prior to standing. Sit to/from stand: cueing for safety/walker management/hand placement on stable surface, CGA   Gait Training   Gait Training Minutes (64029) 8   Symptoms Noted During/After Treatment (Gait Training) fatigue;increased pain   Treatment Detail/Skilled Intervention cueing for safety/walker management/keeping walker close/posture; Increased pain report by pt up to 8/10 at end of session - RN notified   [place] Level (Gait Training) contact guard   Physical Assistance Level (Gait Training) supervision;verbal cues;1 person + 1 person to manage equipment   Assistive Device (Gait Training) rolling walker   Pattern Analysis (Gait Training) swing-through gait   Gait Analysis Deviations decreased kellee   Impairments (Gait Analysis/Training) pain;strength decreased   PT Discharge Planning   PT Plan Stairs, gait   PT Discharge Recommendation (DC Rec) home with assist  (pending stair and longer gait trial)   PT Rationale for DC Rec pt is CGA for gait & transfers. Has assistance from family at home   PT Brief overview of current status CGA gait and transfers   Total Session Time   Timed Code Treatment Minutes 21   Total Session Time (sum of timed and untimed services) 31

## 2023-10-02 NOTE — OP NOTE
DATE OF SERVICE: 10/2/23      PREOPERATIVE DIAGNOSES:  1.  Multilevel degenerative disk disease cervical spine.  2.  Severe foraminal stenosis left greater than right C3-4  3.  Severe foraminal stenosis right greater than left along with central stenosis C4-5  4.  Failure of conservative measures.        POSTOPERATIVE DIAGNOSES:  Same    PROCEDURES:  1.  Left-sided [place] anterior approach to cervical spine.  2.  Complete block diskectomy at C3-4 and C4-5 with bilateral uncovertebral  resections and complete decompression of all exiting nerves and the spinal cord.  3.  Anterior cervical fusion C3-4 and C4-5  4.  Placement of a RamseyPLAYD8et SHAWNA-C titanium coated cage with 1/2 cc of DBX cage size was 7 mm height 14 mm depth 17 mm width 7 degrees lordosis at both C3-4 and C4-5  5.  Placement of medium plates C3-4 and C4-5    SURGEON:  Dr. Yovanny Cruz.    ASSISTANT:  Damaso Roberts PA-C who was needed for patient positioning, soft tissue  retraction, patient safety and assistance with closure of the wound.    ESTIMATED BLOOD LOSS:  50 mL.    DRAINS:  None.    COMPLICATIONS:  None perceived.    SPECIMENS SENT:  None.    HISTORY OF PRESENT ILLNESS AND INDICATIONS FOR PROCEDURE: This is a 68-year-old gentleman who came to clinic with radicular symptoms.  He has multilevel severe degenerative disc disease throughout his entire cervical spine but the symptoms are most consistent with the C3-4 and C4-5 levels.  We discussed risks benefits options and alternatives to surgery and he opted for the surgery.  Given his significant size of his neck he understood that all complication profiles are higher because of the complexity of his size.      Therefore, the patient was seen in the preop area of [facility] today. The   neck was marked and the consent was again reverified.  He   was brought to the operating room, intubated via general endotracheal anesthetic and  placed on a flat top table with care taken to pad all  bony prominences.  Pause  for the Cause was performed correctly identifying the patient, procedure, proposed  plan and radiographs and all were in agreement.  We then localized our incision so  as not to cause any iatrogenic tissue damage and performed a left-sided [place] anterior approach to cervical spine.  We dissected down over the C3-C4 and C5 vertebral bodies taking care to protect the disk spaces above.  We then placed  our self-retaining retractors and began with our complete block diskectomy at C3-4.   There was a severe amount of foraminal stenosis left greater than right.  We removed this and  dissected all the way back to the level of the spinal cord behind the posterior  longitudinal ligament.  We made certain there was no continued neural compression.   We then gently decorticated the endplates making sure not to dig too deeply so that  we would make certain to avoid any subsidence.  We then placed the cage along with 1/2 cc of DBX.  We then turned our attention to C4-5.  We  moved our self-retaining retractors and then performed a complete block diskectomy  at this level.  We dissected all the way back to the level of the spinal cord and into the bilateral foramen.  There was a severe amount of compression there.  There was also a fair amount of central stenosis which we removed all the way back to the level of the cord.  We were able to remove all of the  disk material.   When we were  finished there was no continued neural compression.  We gently decorticated the  endplates again placed the anterior cervical cage.  We then placed our 2 medium plates.  When we were finished there was no continued neural compression.  We had locked all the screws into the plate.  The esophagus and carotid sheath were in good repair.  We therefore closed the platysma with #1  Vicryl, subcutaneous tissue with 2-0 Vicryl, skin with 3-0 Vicryl.  Steri-Strips and  sterile dressing were applied.    The patient  tolerated procedure well.  There were no apparent complications.  Patient will be weightbearing as tolerated bilateral lower extremities with strict instructions to avoid lifting more than a full gallon of milk over the next 6 weeks.  He will have early mobilization and ESTRELLA stockings for DVT prophylaxis and 2 grams of Ancef IV q.8 hours x2 doses for antibiotics.   Return to see me in 6 weeks, sooner if there are any problems, questions or  concerns.  He can have a soft collar for his comfort.

## 2023-10-02 NOTE — ANESTHESIA PREPROCEDURE EVALUATION
Anesthesia Pre-Procedure Evaluation    Patient: Varun Crowder   MRN: [id number] : 1955        Procedure : Procedure(s):  BILATERAL CERVICAL 3 - CERVICAL 4 AND CERVICAL 4 - CERVICAL 5 ANTERIOR CERVICAL DECOMPRESSION FUSION          History reviewed. No pertinent past medical history.   Past Surgical History:   Procedure Laterality Date    ORTHOPEDIC SURGERY        No Known Allergies   Social History     Tobacco Use    Smoking status: Former     Types: Cigarettes    Smokeless tobacco: Never   Substance Use Topics    Alcohol use: Yes     Comment: 3 times a week, few beers      Wt Readings from Last 1 Encounters:   10/02/23 116.9 kg (257 lb 11.2 oz)        Anesthesia Evaluation   Pt has had prior anesthetic.     No history of anesthetic complications       ROS/MED HX  ENT/Pulmonary:     (+)                tobacco use (marajuana),                       Neurologic:       Cardiovascular:  - neg cardiovascular ROS     METS/Exercise Tolerance:     Hematologic:  - neg hematologic  ROS     Musculoskeletal:  - neg musculoskeletal ROS     GI/Hepatic:  - neg GI/hepatic ROS  (-) GERD   Renal/Genitourinary:  - neg Renal ROS     Endo:     (+)               Obesity,       Psychiatric/Substance Use:     (+)   alcohol abuse      Infectious Disease:       Malignancy:       Other:            Physical Exam    Airway  airway exam normal      Mallampati: II   TM distance: > 3 FB   Neck ROM: full   Mouth opening: > 3 cm    Respiratory Devices and Support         Dental  no notable dental history     (+) Edentulous      Cardiovascular   cardiovascular exam normal       Rhythm and rate: regular and normal     Pulmonary   pulmonary exam normal        breath sounds clear to auscultation           OUTSIDE LABS:  CBC: No results found for: WBC, HGB, HCT, PLT  BMP: No results found for: NA, POTASSIUM, CHLORIDE, CO2, BUN, CR, GLC  COAGS: No results found for: PTT, INR, FIBR  POC: No results found for: BGM, HCG, HCGS  HEPATIC: No results  found for: ALBUMIN, PROTTOTAL, ALT, AST, GGT, ALKPHOS, BILITOTAL, BILIDIRECT, CELIA  OTHER: No results found for: PH, LACT, A1C, KIKE, PHOS, MAG, LIPASE, AMYLASE, TSH, T4, T3, CRP, SED    Anesthesia Plan    ASA Status:  3    NPO Status:  NPO Appropriate    Anesthesia Type: General.     - Airway: ETT   Induction: RSI.   Maintenance: Balanced.        Consents    Anesthesia Plan(s) and associated risks, benefits, and realistic alternatives discussed. Questions answered and patient/representative(s) expressed understanding.     - Discussed:     - Discussed with:  Patient      - Extended Intubation/Ventilatory Support Discussed: No.      - Patient is DNR/DNI Status: No     Use of blood products discussed: No .     Postoperative Care    Pain management: IV analgesics, Oral pain medications.   PONV prophylaxis: Ondansetron (or other 5HT-3), Dexamethasone or Solumedrol, Background Propofol Infusion     Comments:                Zana Garcia MD

## 2023-10-02 NOTE — ANESTHESIA CARE TRANSFER NOTE
Patient: Varun Crowder    Procedure: Procedure(s):  BILATERAL CERVICAL 3 - CERVICAL 4 AND CERVICAL 4 - CERVICAL 5 ANTERIOR CERVICAL DECOMPRESSION FUSION       Diagnosis: Cervical radicular pain [M54.12]  Spinal stenosis of cervical region [M48.02]  Diagnosis Additional Information: No value filed.    Anesthesia Type:   General     Note:    Oropharynx: oropharynx clear of all foreign objects  Level of Consciousness: drowsy  Oxygen Supplementation: face mask  Level of Supplemental Oxygen (L/min / FiO2): 10  Independent Airway: airway patency satisfactory and stable  Dentition: dentition unchanged  Vital Signs Stable: post-procedure vital signs reviewed and stable  Report to RN Given: handoff report given  Patient transferred to: PACU    Handoff Report: Identifed the Patient, Identified the Reponsible Provider, Reviewed the pertinent medical history, Discussed the surgical course, Reviewed Intra-OP anesthesia mangement and issues during anesthesia, Set expectations for post-procedure period and Allowed opportunity for questions and acknowledgement of understanding      Vitals:  Vitals Value Taken Time   /112 10/02/23 1215   Temp     Pulse 97 10/02/23 1219   Resp 26 10/02/23 1219   SpO2 86 % 10/02/23 1219   Vitals shown include unvalidated device data.    Electronically Signed By: COOKIE Villafana CRNA  October 2, 2023  12:21 PM

## 2023-10-02 NOTE — ANESTHESIA POSTPROCEDURE EVALUATION
Patient: Varun Crowder    Procedure: Procedure(s):  BILATERAL CERVICAL 3 - CERVICAL 4 AND CERVICAL 4 - CERVICAL 5 ANTERIOR CERVICAL DECOMPRESSION FUSION       Anesthesia Type:  General    Note:  Disposition: Inpatient   Postop Pain Control: Uneventful            Sign Out: Well controlled pain   PONV: No   Neuro/Psych: Uneventful            Sign Out: Acceptable/Baseline neuro status   Airway/Respiratory: Uneventful            Sign Out: Acceptable/Baseline resp. status   CV/Hemodynamics: Uneventful            Sign Out: Acceptable CV status; No obvious hypovolemia; No obvious fluid overload   Other NRE: NONE   DID A NON-ROUTINE EVENT OCCUR? No           Last vitals:  Vitals Value Taken Time   /94 10/02/23 1320   Temp 36.6  C (97.8  F) 10/02/23 1250   Pulse 97 10/02/23 1330   Resp 36 10/02/23 1329   SpO2 93 % 10/02/23 1330   Vitals shown include unvalidated device data.    Electronically Signed By: Zana Garcia MD  October 2, 2023  2:59 PM

## 2023-10-02 NOTE — INTERVAL H&P NOTE
I have reviewed the surgical (or preoperative) H&P that is linked to this encounter, and examined the patient. There are no significant changes    Clinical Conditions Present on Arrival:  Clinically Significant Risk Factors Present on Admission                  # Obesity: Estimated body mass index is 34.95 kg/m  as calculated from the following:    Height as of this encounter: 1.829 m (6').    Weight as of this encounter: 116.9 kg (257 lb 11.2 oz).

## 2023-10-03 ENCOUNTER — APPOINTMENT (OUTPATIENT)
Dept: OCCUPATIONAL THERAPY | Facility: CLINIC | Age: 68
DRG: 471 | End: 2023-10-03
Attending: PHYSICIAN ASSISTANT
Payer: COMMERCIAL

## 2023-10-03 ENCOUNTER — APPOINTMENT (OUTPATIENT)
Dept: PHYSICAL THERAPY | Facility: CLINIC | Age: 68
DRG: 471 | End: 2023-10-03
Attending: ORTHOPAEDIC SURGERY
Payer: COMMERCIAL

## 2023-10-03 PROBLEM — R06.83 SNORING: Status: ACTIVE | Noted: 2023-10-03

## 2023-10-03 PROBLEM — R73.03 PREDIABETES: Status: ACTIVE | Noted: 2023-10-03

## 2023-10-03 LAB
GLUCOSE BLDC GLUCOMTR-MCNC: 115 MG/DL (ref 70–99)
GLUCOSE BLDC GLUCOMTR-MCNC: 118 MG/DL (ref 70–99)
GLUCOSE BLDC GLUCOMTR-MCNC: 139 MG/DL (ref 70–99)
HBA1C MFR BLD: 6.4 %
HGB BLD-MCNC: 13.2 G/DL (ref 13.3–17.7)

## 2023-10-03 PROCEDURE — 36415 COLL VENOUS BLD VENIPUNCTURE: CPT

## 2023-10-03 PROCEDURE — 99232 SBSQ HOSP IP/OBS MODERATE 35: CPT | Performed by: FAMILY MEDICINE

## 2023-10-03 PROCEDURE — 97166 OT EVAL MOD COMPLEX 45 MIN: CPT | Mod: GO

## 2023-10-03 PROCEDURE — 120N000004 HC R&B MS OVERFLOW

## 2023-10-03 PROCEDURE — 97110 THERAPEUTIC EXERCISES: CPT | Mod: GP

## 2023-10-03 PROCEDURE — 250N000013 HC RX MED GY IP 250 OP 250 PS 637: Performed by: FAMILY MEDICINE

## 2023-10-03 PROCEDURE — 97116 GAIT TRAINING THERAPY: CPT | Mod: GP

## 2023-10-03 PROCEDURE — 250N000013 HC RX MED GY IP 250 OP 250 PS 637: Performed by: PHYSICIAN ASSISTANT

## 2023-10-03 PROCEDURE — 97535 SELF CARE MNGMENT TRAINING: CPT | Mod: GO

## 2023-10-03 PROCEDURE — 83036 HEMOGLOBIN GLYCOSYLATED A1C: CPT | Performed by: FAMILY MEDICINE

## 2023-10-03 PROCEDURE — 250N000011 HC RX IP 250 OP 636: Mod: JZ | Performed by: PHYSICIAN ASSISTANT

## 2023-10-03 PROCEDURE — 85018 HEMOGLOBIN: CPT

## 2023-10-03 PROCEDURE — 999N000157 HC STATISTIC RCP TIME EA 10 MIN

## 2023-10-03 RX ORDER — NICOTINE POLACRILEX 4 MG
15-30 LOZENGE BUCCAL
Status: DISCONTINUED | OUTPATIENT
Start: 2023-10-03 | End: 2023-10-06 | Stop reason: HOSPADM

## 2023-10-03 RX ORDER — DEXTROSE MONOHYDRATE 25 G/50ML
25-50 INJECTION, SOLUTION INTRAVENOUS
Status: DISCONTINUED | OUTPATIENT
Start: 2023-10-03 | End: 2023-10-06 | Stop reason: HOSPADM

## 2023-10-03 RX ADMIN — HYDROXYZINE HYDROCHLORIDE 10 MG: 10 TABLET ORAL at 18:59

## 2023-10-03 RX ADMIN — GABAPENTIN 100 MG: 100 CAPSULE ORAL at 21:40

## 2023-10-03 RX ADMIN — OXYCODONE HYDROCHLORIDE 10 MG: 5 TABLET ORAL at 03:40

## 2023-10-03 RX ADMIN — SENNOSIDES AND DOCUSATE SODIUM 1 TABLET: 50; 8.6 TABLET ORAL at 21:40

## 2023-10-03 RX ADMIN — HYDROXYZINE HYDROCHLORIDE 10 MG: 10 TABLET ORAL at 12:53

## 2023-10-03 RX ADMIN — OXYCODONE HYDROCHLORIDE 10 MG: 5 TABLET ORAL at 18:59

## 2023-10-03 RX ADMIN — ACETAMINOPHEN 975 MG: 325 TABLET ORAL at 12:53

## 2023-10-03 RX ADMIN — LISINOPRIL 5 MG: 5 TABLET ORAL at 08:09

## 2023-10-03 RX ADMIN — THERA TABS 1 TABLET: TAB at 08:09

## 2023-10-03 RX ADMIN — CEFAZOLIN SODIUM 2 G: 2 INJECTION, SOLUTION INTRAVENOUS at 03:33

## 2023-10-03 RX ADMIN — SENNOSIDES AND DOCUSATE SODIUM 1 TABLET: 50; 8.6 TABLET ORAL at 08:09

## 2023-10-03 RX ADMIN — ACETAMINOPHEN 975 MG: 325 TABLET ORAL at 21:40

## 2023-10-03 RX ADMIN — POLYETHYLENE GLYCOL 3350 17 G: 17 POWDER, FOR SOLUTION ORAL at 08:09

## 2023-10-03 RX ADMIN — OXYCODONE HYDROCHLORIDE 10 MG: 5 TABLET ORAL at 12:52

## 2023-10-03 RX ADMIN — HYDROMORPHONE HYDROCHLORIDE 0.4 MG: 0.2 INJECTION, SOLUTION INTRAMUSCULAR; INTRAVENOUS; SUBCUTANEOUS at 19:51

## 2023-10-03 RX ADMIN — ACETAMINOPHEN 975 MG: 325 TABLET ORAL at 06:41

## 2023-10-03 ASSESSMENT — ACTIVITIES OF DAILY LIVING (ADL)
ADLS_ACUITY_SCORE: 23
ADLS_ACUITY_SCORE: 21
ADLS_ACUITY_SCORE: 23
ADLS_ACUITY_SCORE: 21
ADLS_ACUITY_SCORE: 23

## 2023-10-03 NOTE — PROGRESS NOTES
Care Management Follow Up    Length of Stay (days): 1    Expected Discharge Date: 10/03/2023     Concerns to be Addressed:       Patient plan of care discussed at interdisciplinary rounds: Yes    Anticipated Discharge Disposition: Home     Anticipated Discharge Services: None  Anticipated Discharge DME: None    Patient/family educated on Medicare website which has current facility and service quality ratings: no  Education Provided on the Discharge Plan: Yes  Patient/Family in Agreement with the Plan: yes    Referrals Placed by CM/SW:  ([facility])  Private pay costs discussed: Not applicable    Additional Information:  Chart reviewed.  Pt lives with wife and children.  Pt will have assist from his wife and adult children at home. No CM needs identified.  Family to transport.     TAHIR Corona

## 2023-10-03 NOTE — PROGRESS NOTES
[facility] MEDICINE  PROGRESS NOTE     Code Status: Full Code  Procedure(s):  BILATERAL CERVICAL 3 - CERVICAL 4 AND CERVICAL 4 - CERVICAL 5 ANTERIOR CERVICAL DECOMPRESSION FUSION  1 Day Post-Op  Identification/Summary:   Varun Crowder is a 68 year old male with a PMH of cervical disc disease and alcohol overuse otherwise generally healthy.  At his preop blood pressures were elevated.   Denies any prior history of high blood pressure or being on an antihypertensive.  Was cleared for surgery.  10/2 underwent cervical fusion.  Pre and postoperatively patient has been quite hypertensive.  Hospitalist service consulted.  Responding well to lisinopril 5 mg daily.  Having some hyperglycemia and notes a history of borderline diabetes.  Follow blood sugars closely.  A1c 6.4.  Also persistently hypoxic.  Relates a history of heavy snoring.  Utilize incentive spirometry.  Monitor overnight pulse oximetry.     Assessment and Plan:     Status post cervical fusion  Postoperative management per surgery.  As best I can determine no preoperative laboratory work was done.  Acute blood loss anemia  Postoperative hemoglobin 13.2.  No indication for transfusion at this time.  Follow per protocols.  Acute hypoxemic respiratory failure  Snoring history  Since surgery has been requiring supplemental oxygen.  Possible candidate for undiagnosed sleep apnea.  Admits to a snoring history.  Utilize incentive spirometry.  Attempt to wean off of oxygen.  Perform overnight pulse oximetry on room air.  Hypertension  Blood pressure pre and postoperatively has consistently been elevated.  Highest reading 183/112.  Preoperative blood pressure was 172/100.  Not chronically on any medications.  Denies a prior history of blood pressure or having recommendations for taking medications for this.  Blood work relatively unremarkable.  Responding well to lisinopril 5 mg daily with reasonable improvement to his blood  pressure.  Hydralazine available for severe elevations.  Prediabetes  Hyperglycemia  Postoperative glucose 159.  A1c 6.4.  On further discussion with the patient he admits to being told he has prediabetes.  Follow blood sugars 4 times a day.  Insulin sliding scale standard range ordered.  We will give patient printed diabetic diet information.  Alcohol overuse  Regularly drinks 0-8 beverages per day.  Noted.  No evidence for withdrawal.     Anticoagulation.  SCDs ordered by surgery.  Standard postsurgical risk.  COVID-19 PCR unknown  Nurses to screen.  Fluids: Per surgery  Pain meds: Per surgery  Therapy: Per surgery   Martinez:Not present  Lines: None       Current Diet  Orders Placed This Encounter      Discharge Instruction - Regular Diet Adult      Advance Diet as Tolerated: Regular Diet Adult    Supplements  None    Barriers to Discharge: Hypoxia, hyperglycemia, overnight pulse oximetry    Disposition: Hopeful discharge 10/4    Clinically Significant Risk Factors Present on Admission          # Hypocalcemia: Lowest Ca = 8.4 mg/dL in last 2 days, will monitor and replace as appropriate         # Hypertension: Noted on problem list   # Acute Respiratory Failure: Documented O2 saturation < 91%.  Continue supplemental oxygen as needed     # Obesity: Estimated body mass index is 34.95 kg/m  as calculated from the following:    Height as of this encounter: 1.829 m (6').    Weight as of this encounter: 116.9 kg (257 lb 11.2 oz).              Interval History/Subjective:  Patient continuing to require 1 to 2 L of nasal cannular oxygen.  Notes that he is a heavy snorer so much so that his wife sleeps in a different room frequently.  Has been told in the past that he is a borderline diabetic.  This was not mentioned in his preop.  Patient pleased to see blood pressure improvement with the initiation of lisinopril.  Questions answered to verbalized satisfaction.      Last 24H PRN:      hydrOXYzine (ATARAX) tablet 10 mg, 10  mg at 10/02/23 2251     oxyCODONE (ROXICODONE) tablet 5 mg **OR** oxyCODONE (ROXICODONE) tablet 10 mg, 10 mg at 10/03/23 0340    Physical Exam/Objective:  Temp:  [97.4  F (36.3  C)-98.6  F (37  C)] 98.6  F (37  C)  Pulse:  [80-96] 80  Resp:  [18-25] 20  BP: (155-194)/() 158/87  SpO2:  [84 %-94 %] 94 %  Wt Readings from Last 4 Encounters:   10/02/23 116.9 kg (257 lb 11.2 oz)     Body mass index is 34.95 kg/m .    Constitutional: awake, alert, cooperative, no apparent distress, and appears stated age  ENT: Limited exam neck brace in place.  Respiratory: No increased work of breathing, good air exchange, clear to auscultation bilaterally, no crackles or wheezing  Cardiovascular: Normal apical impulse, regular rate and rhythm, normal S1 and S2, no S3 or S4, and no murmur noted  GI: No scars, normal bowel sounds, soft, non-distended, non-tender, no masses palpated, no hepatosplenomegally  Skin: normal skin color, texture, turgor, no redness, warmth, or swelling, and no rashes  Musculoskeletal: There is no redness, warmth, or swelling of the joints.  Motor strength is 5 out of 5 all extremities bilaterally.  Tone is normal. no lower extremity pitting edema present  Neurologic: Cranial nerves II-XII are grossly intact. Sensory:  Sensory intact  Neuropsychiatric: General: normal, calm, and normal eye contact Level of consciousness: alert / normal Affect: normal Orientation: oriented to self, place, time and situation Memory and insight: normal, memory for past and recent events intact, and thought process normal      Medications:   Personally Reviewed.  Medications     sodium chloride Stopped (10/02/23 2030)       acetaminophen  975 mg Oral Q8H     gabapentin  100 mg Oral At Bedtime     lisinopril  5 mg Oral Daily     multivitamin, therapeutic  1 tablet Oral Daily     polyethylene glycol  17 g Oral Daily     senna-docusate  1 tablet Oral BID       Data reviewed today: I personally reviewed all new medications, labs,  imaging/diagnostics reports over the past 24 hours. Pertinent findings include:    Imaging:   Recent Results (from the past 24 hour(s))   XR Surgery DIALLO  Fluoro G/T 5 Min    Narrative    This exam was marked as non-reportable because it will not be read by a   radiologist or a [facility] non-radiologist provider.             Labs:  XR Surgery DIALLO  Fluoro G/T 5 Min   Final Result        Recent Results (from the past 24 hour(s))   Basic metabolic panel    Collection Time: 10/02/23  2:27 PM   Result Value Ref Range    Sodium 139 135 - 145 mmol/L    Potassium 4.1 3.4 - 5.3 mmol/L    Chloride 103 98 - 107 mmol/L    Carbon Dioxide (CO2) 28 22 - 29 mmol/L    Anion Gap 8 7 - 15 mmol/L    Urea Nitrogen 16.3 8.0 - 23.0 mg/dL    Creatinine 0.72 0.67 - 1.17 mg/dL    GFR Estimate >90 >60 mL/min/1.73m2    Calcium 8.4 (L) 8.8 - 10.2 mg/dL    Glucose 159 (H) 70 - 99 mg/dL   Hepatic function panel    Collection Time: 10/02/23  2:27 PM   Result Value Ref Range    Protein Total 7.4 6.4 - 8.3 g/dL    Albumin 4.1 3.5 - 5.2 g/dL    Bilirubin Total 0.2 <=1.2 mg/dL    Alkaline Phosphatase 91 40 - 129 U/L    AST 27 0 - 45 U/L    ALT 12 0 - 70 U/L    Bilirubin Direct <0.20 0.00 - 0.30 mg/dL   TSH with free T4 reflex    Collection Time: 10/02/23  2:27 PM   Result Value Ref Range    TSH 3.29 0.30 - 4.20 uIU/mL   Hemoglobin    Collection Time: 10/03/23  9:41 AM   Result Value Ref Range    Hemoglobin 13.2 (L) 13.3 - 17.7 g/dL       Pending Labs:  Unresulted Labs Ordered in the Past 30 Days of this Admission       No orders found for last 31 day(s).              Mulugeta Fernandez MD  [facility]  Phone: #[phone]

## 2023-10-03 NOTE — PROGRESS NOTES
Orthopedic Progress Note      Assessment: 1 Day Post-Op  S/P Procedure(s):  BILATERAL CERVICAL 3 - CERVICAL 4 AND CERVICAL 4 - CERVICAL 5 ANTERIOR CERVICAL DECOMPRESSION FUSION @    Plan:   - Continue PT/OT  - Weightbearing status: WBAT can use brace for comfort  - No bending, twisting or lifting more than 10 pounds for 6 weeks.  - Anticoagulation: no chemical prophylaxis in addition to SCDs, felisha stockings and early ambulation.  - Pain control at discharge: Oxycodone 5 mg 1-2 tabs Q4-6 hours x30-32 tabs, Hydroxyzine 25 mg QID PRN, Gabapentin 100 mg TID , Tizanidine 4 mg TID, Acetaminophen 1000 mg TID  - Discharge planning: pending swallowing improvement and off O2        Subjective:  Pain: mild  Chest pain, SOB: no  Nausea, Vomiting:  no  Lightheadedness, Dizziness:  no  Neuro:  Patient denies new onset numbness or paresthesias    Patient is doing well postop day 1.  Ambulating, tolerating oral intake although notes that it is difficult to swallow.  He had eggs for breakfast noted that it was slow but he was able to swallow it without extreme difficulty.  We will continue to monitor his swallowing progress over today.  Also continues to need oxygen he is not on oxygen at home we will continue to monitor this as well    Objective:  BP (!) 158/87 (BP Location: Left arm)   Pulse 80   Temp 98.6  F (37  C) (Oral)   Resp 20   Ht 1.829 m (6')   Wt 116.9 kg (257 lb 11.2 oz)   SpO2 94%   BMI 34.95 kg/m    The patient is A&Ox3. Appears comfortable.   Sensation is intact.  Able to make full fist, oppose fingers to thumb and give thumbs up okay sign  Radial pulse intact.  Calves are soft and non-tender. Negative Willard's.  The incision is covered. Dressing C/D/I.      Pertinent Labs   Lab Results: personally reviewed.   No results found for: INR, PROTIME  Lab Results   Component Value Date    HGB 13.2 (L) 10/03/2023     Lab Results   Component Value Date     10/02/2023    CO2 28 10/02/2023         Report completed  by:  Eleonora Colindres PA-C/Dr. [facility]  [facility]    Date: 10/3/2023  Time: 11:11 AM

## 2023-10-03 NOTE — PLAN OF CARE
Problem: Pain Acute  Goal: Optimal Pain Control and Function  Outcome: Progressing    Patient Aox4, pleasant, cooperative. C/o neck incisional pain controlled with prabha/prn pain medication. SBA with cane. Wearing neck collar. Vitally stable on 1-2L oxygen via NC.

## 2023-10-03 NOTE — PROGRESS NOTES
Physical Therapy Discharge Summary    Reason for therapy discharge:    All goals and outcomes met, no further needs identified.    Progress towards therapy goal(s). See goals on Care Plan in [facility] electronic health record for goal details.  Goals met    Therapy recommendation(s):    No further therapy is recommended.

## 2023-10-03 NOTE — PROGRESS NOTES
10/03/23 0730   Appointment Info   Signing Clinician's Name / Credentials (OT) Darrell Bruce, OTR/L   Living Environment   People in Home spouse;child(kristine), adult   Current Living Arrangements house   Living Environment Comments WIS with GB and std toilet with GB   Self-Care   Usual Activity Tolerance good   Current Activity Tolerance good   Instrumental Activities of Daily Living (IADL)   Previous Responsibilities meal prep;housekeeping;work;driving   IADL Comments Wife assists with other IADLs   General Information   Onset of Illness/Injury or Date of Surgery 10/02/23   Referring Physician Dr. Tremaine Cruz   Patient/Family Therapy Goal Statement (OT) To return home with family   Additional Occupational Profile Info/Pertinent History of Current Problem Adm for Cerv Fusion.  PMH:HTN, Obesity, ETOH use, R shoulder injury and L hip chronic pain.   Existing Precautions/Restrictions (S)  spinal   Cognitive Status Examination   Follows Commands WNL   Visual Perception   Visual Impairment/Limitations corrective lenses for distance   Bed Mobility   Bed Mobility supine-sit   Supine-Sit [place] (Bed Mobility) supervision;verbal cues   Assistive Device (Bed Mobility) other (see comments)  (HOB elevated.)   Transfers   Transfers bed-chair transfer   Transfer Comments Uses either walker or cane - PT to determine best device to use.   Transfer Skill: Bed to Chair/Chair to Bed   Bed-Chair [place] (Transfers) supervision;verbal cues   Transfer Comments No device used for this close trsf.   Balance   Balance Assessment standing balance: dynamic   Balance Comments good   Activities of Daily Living   BADL Assessment/Intervention upper body dressing;lower body dressing   Upper Body Dressing Assessment/Training   Position (Upper Body Dressing) supported sitting   [place] Level (Upper Body Dressing) supervision;verbal cues   Lower Body Dressing Assessment/Training   Position (Lower Body Dressing) supported  sitting;supported standing   [place] Level (Lower Body Dressing) maximum assist (25% patient effort)   Clinical Impression   Criteria for Skilled Therapeutic Interventions Met (OT) Yes, treatment indicated   OT Diagnosis decreased indep with ADLs and trsfs due to spine surgery.   OT Problem List-Impairments impacting ADL problems related to;mobility;post-surgical precautions   Assessment of Occupational Performance 3-5 Performance Deficits   Identified Performance Deficits dressing, toileting, bathing, trsfs, G/H   Planned Therapy Interventions (OT) ADL retraining   Clinical Decision Making Complexity (OT) moderate complexity   Anticipated Equipment Needs Upon Discharge (OT) other (see comments)  (sock aid - wide)   Risk & Benefits of therapy have been explained evaluation/treatment results reviewed;participants included;patient   OT Total Evaluation Time   OT Eval, Moderate Complexity Minutes (67400) 15   OT Goals   Therapy Frequency (OT) One time eval and treatment   OT Predicted Duration/Target Date for Goal Attainment 10/03/23   OT Goals Upper Body Dressing;Lower Body Dressing;Transfers   OT: Upper Body Dressing Independent;Completed   OT: Lower Body Dressing Modified independent;Completed   OT: Transfer Modified independent;Completed   Interventions   Interventions Quick Adds Self-Care/Home Management   Self-Care/Home Management   Self-Care/Home Mgmt/ADL, Compensatory, Meal Prep Minutes (74435) 30   Treatment Detail/Skilled Intervention Pt in bed when therapist arrived.  Reviewed with Pt his spine precautions and provided written handout.  Worked on bed mobility supine to sit with HOB elevated.  Pt needed SBA initially for proper head alignment.  Pt does not tolerate sidelying due to R shoulder pain and L hip pain.  Once in sitting, Pt was able to complete sit to stand to trsf to the chair with SBA initially to mod indep using a walker.  From the chair, Pt completed FB dressing.  Initially needing VC for  posture and technique.  By end of session, Pt was mod indep with L/B dressing but did need to use the wide sock aid to don the L sock.  Pt will talk to his family re:  purchasing a wide sock aid for home.  Wife will be able to assist Pt initially at home.  Pt on 3L NC O2 with Sats 90% at rest.  O2 sats did drop to upper 80's with talking and 93% after dressing.  Therapist demo the car trsf, WIS trsf and Pt verbalized his understanding.   Mobility limited by O2 tubing as Pt was seen in an ICU room.  At end of session, Pt was in the chair and chair alarm on and call light within reach.   OT Discharge Planning   OT Plan D/C OT   OT Discharge Recommendation (DC Rec)   (Defer to Ortho Team)   OT Rationale for DC Rec Pt will have assist from his wife and adult children at home.  May benefit from a wide sock aid.   OT Brief overview of current status Mod indep with ADLs and trsfs.   Total Session Time   Timed Code Treatment Minutes 30   Total Session Time (sum of timed and untimed services) 45

## 2023-10-04 ENCOUNTER — APPOINTMENT (OUTPATIENT)
Dept: CT IMAGING | Facility: CLINIC | Age: 68
DRG: 471 | End: 2023-10-04
Attending: FAMILY MEDICINE
Payer: COMMERCIAL

## 2023-10-04 PROBLEM — J96.01 ACUTE HYPOXEMIC RESPIRATORY FAILURE (H): Status: ACTIVE | Noted: 2023-10-04

## 2023-10-04 LAB
GLUCOSE BLDC GLUCOMTR-MCNC: 109 MG/DL (ref 70–99)
GLUCOSE BLDC GLUCOMTR-MCNC: 110 MG/DL (ref 70–99)
GLUCOSE BLDC GLUCOMTR-MCNC: 119 MG/DL (ref 70–99)
GLUCOSE BLDC GLUCOMTR-MCNC: 139 MG/DL (ref 70–99)
GLUCOSE SERPL-MCNC: 109 MG/DL (ref 70–99)

## 2023-10-04 PROCEDURE — 99232 SBSQ HOSP IP/OBS MODERATE 35: CPT | Performed by: FAMILY MEDICINE

## 2023-10-04 PROCEDURE — 999N000157 HC STATISTIC RCP TIME EA 10 MIN

## 2023-10-04 PROCEDURE — 94640 AIRWAY INHALATION TREATMENT: CPT

## 2023-10-04 PROCEDURE — 71275 CT ANGIOGRAPHY CHEST: CPT

## 2023-10-04 PROCEDURE — 36415 COLL VENOUS BLD VENIPUNCTURE: CPT | Performed by: FAMILY MEDICINE

## 2023-10-04 PROCEDURE — 250N000013 HC RX MED GY IP 250 OP 250 PS 637: Performed by: PHYSICIAN ASSISTANT

## 2023-10-04 PROCEDURE — 82947 ASSAY GLUCOSE BLOOD QUANT: CPT | Performed by: FAMILY MEDICINE

## 2023-10-04 PROCEDURE — 250N000009 HC RX 250: Performed by: FAMILY MEDICINE

## 2023-10-04 PROCEDURE — 94640 AIRWAY INHALATION TREATMENT: CPT | Mod: 76

## 2023-10-04 PROCEDURE — 250N000013 HC RX MED GY IP 250 OP 250 PS 637: Performed by: FAMILY MEDICINE

## 2023-10-04 PROCEDURE — 120N000004 HC R&B MS OVERFLOW

## 2023-10-04 PROCEDURE — 250N000011 HC RX IP 250 OP 636: Mod: JZ | Performed by: ORTHOPAEDIC SURGERY

## 2023-10-04 RX ORDER — IOPAMIDOL 755 MG/ML
75 INJECTION, SOLUTION INTRAVASCULAR ONCE
Status: COMPLETED | OUTPATIENT
Start: 2023-10-04 | End: 2023-10-04

## 2023-10-04 RX ORDER — IPRATROPIUM BROMIDE AND ALBUTEROL SULFATE 2.5; .5 MG/3ML; MG/3ML
3 SOLUTION RESPIRATORY (INHALATION)
Status: DISCONTINUED | OUTPATIENT
Start: 2023-10-04 | End: 2023-10-06

## 2023-10-04 RX ADMIN — HYDROXYZINE HYDROCHLORIDE 10 MG: 10 TABLET ORAL at 01:49

## 2023-10-04 RX ADMIN — OXYCODONE HYDROCHLORIDE 10 MG: 5 TABLET ORAL at 05:47

## 2023-10-04 RX ADMIN — HYDROXYZINE HYDROCHLORIDE 10 MG: 10 TABLET ORAL at 10:35

## 2023-10-04 RX ADMIN — LISINOPRIL 5 MG: 5 TABLET ORAL at 09:02

## 2023-10-04 RX ADMIN — ACETAMINOPHEN 975 MG: 325 TABLET ORAL at 13:52

## 2023-10-04 RX ADMIN — IPRATROPIUM BROMIDE AND ALBUTEROL SULFATE 3 ML: .5; 3 SOLUTION RESPIRATORY (INHALATION) at 19:21

## 2023-10-04 RX ADMIN — HYDROXYZINE HYDROCHLORIDE 10 MG: 10 TABLET ORAL at 16:28

## 2023-10-04 RX ADMIN — SENNOSIDES AND DOCUSATE SODIUM 1 TABLET: 50; 8.6 TABLET ORAL at 09:02

## 2023-10-04 RX ADMIN — IOPAMIDOL 75 ML: 755 INJECTION, SOLUTION INTRAVENOUS at 09:29

## 2023-10-04 RX ADMIN — HYDROXYZINE HYDROCHLORIDE 10 MG: 10 TABLET ORAL at 22:51

## 2023-10-04 RX ADMIN — POLYETHYLENE GLYCOL 3350 17 G: 17 POWDER, FOR SOLUTION ORAL at 09:02

## 2023-10-04 RX ADMIN — IPRATROPIUM BROMIDE AND ALBUTEROL SULFATE 3 ML: .5; 3 SOLUTION RESPIRATORY (INHALATION) at 15:21

## 2023-10-04 RX ADMIN — ACETAMINOPHEN 975 MG: 325 TABLET ORAL at 05:47

## 2023-10-04 RX ADMIN — SENNOSIDES AND DOCUSATE SODIUM 1 TABLET: 50; 8.6 TABLET ORAL at 20:29

## 2023-10-04 RX ADMIN — OXYCODONE HYDROCHLORIDE 10 MG: 5 TABLET ORAL at 00:10

## 2023-10-04 RX ADMIN — IPRATROPIUM BROMIDE AND ALBUTEROL SULFATE 3 ML: .5; 3 SOLUTION RESPIRATORY (INHALATION) at 14:00

## 2023-10-04 RX ADMIN — ACETAMINOPHEN 975 MG: 325 TABLET ORAL at 22:10

## 2023-10-04 RX ADMIN — THERA TABS 1 TABLET: TAB at 09:02

## 2023-10-04 RX ADMIN — OXYCODONE HYDROCHLORIDE 10 MG: 5 TABLET ORAL at 20:29

## 2023-10-04 RX ADMIN — OXYCODONE HYDROCHLORIDE 10 MG: 5 TABLET ORAL at 10:35

## 2023-10-04 RX ADMIN — OXYCODONE HYDROCHLORIDE 10 MG: 5 TABLET ORAL at 16:28

## 2023-10-04 RX ADMIN — GABAPENTIN 100 MG: 100 CAPSULE ORAL at 20:29

## 2023-10-04 ASSESSMENT — ACTIVITIES OF DAILY LIVING (ADL)
ADLS_ACUITY_SCORE: 21

## 2023-10-04 NOTE — CARE PLAN
Shift Events:     PRN medications given for pain and anxiety (See MAR). Home O2 Study overnight.      Assessment:     Neuro: Q4 neuros Intact    Respiratory: 3L O2 overnight    Cardiovascular: Apical pulse regular    GI/: Voiding. Diet: Regular. No BM ts shift.    Lines/Drains: L) PIV    Pain: Pt. Reporting pain throughout shift PRN mediations given(See MAR).       Problem: Risk for Delirium  Goal: Improved Behavioral Control  Outcome: Progressing  Intervention: Minimize Safety Risk  Recent Flowsheet Documentation  Taken 10/4/2023 0000 by Lyly Garcia RN  Enhanced Safety Measures: room near unit station  Trust Relationship/Rapport:   care explained   choices provided   questions answered   questions encouraged  Goal: Improved Sleep  Outcome: Progressing     Problem: Pain Acute  Goal: Optimal Pain Control and Function  Outcome: Progressing  Intervention: Prevent or Manage Pain  Recent Flowsheet Documentation  Taken 10/4/2023 0000 by Lyly Garcia RN  Medication Review/Management: medications reviewed   Goal Outcome Evaluation:

## 2023-10-04 NOTE — PROGRESS NOTES
BP (!) 143/74 (BP Location: Left arm)   Pulse 94   Temp 98.6  F (37  C) (Oral)   Resp 20   Ht 1.829 m (6')   Wt 116.9 kg (257 lb 11.2 oz)   SpO2 92%   BMI 34.95 kg/m      Pt was on 3 lpm NC when last seen. Pt had some exp whz throughout pre and post nebs. Pt had a non productive cough. Rt will continue to follow.

## 2023-10-04 NOTE — PLAN OF CARE
Problem: Pain Acute  Goal: Optimal Pain Control and Function  Outcome: Progressing    Patient Aox4, pleasant, cooperative. C/o incisional neck pain controlled with prabha/prn pain medication. Assist of 1 w/ cane. Vitally stable on 2L oxygen.

## 2023-10-04 NOTE — PROVIDER NOTIFICATION
10/03/23 2237   Sleep Oximetry Log   Note: Testing will be done on Room Air unless specified by Physician   Pulse 83   SpO2 93 %   FiO2 (%) 32 %   O2 Device (LPM) 3 LPM   Respirations 20   Awake / Asleep Asleep   Position Other (see comments)  (Semi-Fwler)     @ 2228: Patient SpO2 on RA = 87%    @ 2233: Patient SpO2 on RA = 86%    @ 2234 Patient vitals on 1LNC: 89/85/20  Titrated O2 to 2L    @ 2235 Patient vitals on 2LNC: 90-91/85/20  Titrated O2 to 3L    @ 2237 Patient vitals on 3LNC: 92-93/83/20    Patient remains on 3LNC. Overnight oximetry still tracking/recording. RN and CNA aware that patient is now on 3L. RT to follow.

## 2023-10-04 NOTE — PROVIDER NOTIFICATION
"   10/03/23 1903   Sleep Oximetry Log   Note: Testing will be done on Room Air unless specified by Physician   Pulse 93   SpO2 88 %   FiO2 (%) 0 %   O2 Device (LPM) 0 LPM   Respirations 18   Awake / Asleep Awake   Position Other (see comments)  (Semi-Folwer)     Patient set up on Overnight Oximetry. Finger pulse ox placed on right index finger. Nasal cannula in place and oxygen titrated down to \"0\" liters. Confirmed appropriate alarm settings and alarm volume. RN and CNA notified. RT to monitor progress, chart and titrate oxygen accordingly.        Kati Coker. RRT  "

## 2023-10-04 NOTE — PROGRESS NOTES
Orthopedic Progress Note      Assessment: 2 Day Post-Op  S/P Procedure(s):  BILATERAL CERVICAL 3 - CERVICAL 4 AND CERVICAL 4 - CERVICAL 5 ANTERIOR CERVICAL DECOMPRESSION FUSION @    Plan:   - Continue PT/OT  - Weightbearing status: WBAT can use brace for comfort  - No bending, twisting or lifting more than 10 pounds for 6 weeks.  - Anticoagulation: no chemical prophylaxis in addition to SCDs, felisha stockings and early ambulation.  - Pain control at discharge: Oxycodone 5 mg 1-2 tabs Q4-6 hours x30-32 tabs, Hydroxyzine 25 mg QID PRN, Gabapentin 100 mg TID , Tizanidine 4 mg TID, Acetaminophen 1000 mg TID  - Discharge planning: pending medical clearance        Subjective:  Pain: mild  Chest pain, SOB: no  Nausea, Vomiting:  no  Lightheadedness, Dizziness:  no  Neuro:  Patient denies new onset numbness or paresthesias    Patient is doing well postop day 1.  Ambulating, tolerating oral intake although able to swallow today.  Pain is under good control.  Still requires 2.5 L of nasal cannula oxygen medicine is ordering CT scan pending CT scan and medical clearance could go home    Objective:  BP (!) 143/74 (BP Location: Left arm)   Pulse 93   Temp 98.6  F (37  C) (Oral)   Resp 20   Ht 1.829 m (6')   Wt 116.9 kg (257 lb 11.2 oz)   SpO2 93%   BMI 34.95 kg/m    The patient is A&Ox3. Appears comfortable.   Sensation is intact.  Able to make full fist, oppose fingers to thumb and give thumbs up okay sign  Radial pulse intact.  Calves are soft and non-tender. Negative Willard's.  The incision is covered. Dressing C/D/I.      Pertinent Labs   Lab Results: personally reviewed.   No results found for: INR, PROTIME  Lab Results   Component Value Date    HGB 13.2 (L) 10/03/2023     Lab Results   Component Value Date     10/02/2023    CO2 28 10/02/2023         Report completed by:  Eleonora Colindres PA-C/[facility]    Date: 10/3/2023  Time: 11:11 AM

## 2023-10-05 ENCOUNTER — APPOINTMENT (OUTPATIENT)
Dept: SPEECH THERAPY | Facility: CLINIC | Age: 68
DRG: 471 | End: 2023-10-05
Attending: FAMILY MEDICINE
Payer: COMMERCIAL

## 2023-10-05 PROBLEM — R50.9 FEVER: Status: ACTIVE | Noted: 2023-10-05

## 2023-10-05 LAB
ALBUMIN SERPL BCG-MCNC: 3.8 G/DL (ref 3.5–5.2)
ALBUMIN UR-MCNC: 20 MG/DL
ALP SERPL-CCNC: 95 U/L (ref 40–129)
ALT SERPL W P-5'-P-CCNC: 16 U/L (ref 0–70)
ANION GAP SERPL CALCULATED.3IONS-SCNC: 8 MMOL/L (ref 7–15)
APPEARANCE UR: CLEAR
AST SERPL W P-5'-P-CCNC: 30 U/L (ref 0–45)
BASE EXCESS BLDV CALC-SCNC: 9.8 MMOL/L
BASO+EOS+MONOS # BLD AUTO: ABNORMAL 10*3/UL
BASO+EOS+MONOS NFR BLD AUTO: ABNORMAL %
BASOPHILS # BLD AUTO: 0 10E3/UL (ref 0–0.2)
BASOPHILS NFR BLD AUTO: 0 %
BILIRUB SERPL-MCNC: 0.4 MG/DL
BILIRUB UR QL STRIP: NEGATIVE
BUN SERPL-MCNC: 10.2 MG/DL (ref 8–23)
CALCIUM SERPL-MCNC: 8.8 MG/DL (ref 8.8–10.2)
CHLORIDE SERPL-SCNC: 97 MMOL/L (ref 98–107)
COLOR UR AUTO: YELLOW
CREAT SERPL-MCNC: 0.54 MG/DL (ref 0.67–1.17)
DEPRECATED HCO3 PLAS-SCNC: 32 MMOL/L (ref 22–29)
EGFRCR SERPLBLD CKD-EPI 2021: >90 ML/MIN/1.73M2
EOSINOPHIL # BLD AUTO: 0.3 10E3/UL (ref 0–0.7)
EOSINOPHIL NFR BLD AUTO: 3 %
ERYTHROCYTE [DISTWIDTH] IN BLOOD BY AUTOMATED COUNT: 13.9 % (ref 10–15)
GLUCOSE BLDC GLUCOMTR-MCNC: 107 MG/DL (ref 70–99)
GLUCOSE BLDC GLUCOMTR-MCNC: 107 MG/DL (ref 70–99)
GLUCOSE BLDC GLUCOMTR-MCNC: 114 MG/DL (ref 70–99)
GLUCOSE BLDC GLUCOMTR-MCNC: 127 MG/DL (ref 70–99)
GLUCOSE SERPL-MCNC: 123 MG/DL (ref 70–99)
GLUCOSE UR STRIP-MCNC: NEGATIVE MG/DL
HCO3 BLDV-SCNC: 35 MMOL/L (ref 24–30)
HCT VFR BLD AUTO: 40.3 % (ref 40–53)
HGB BLD-MCNC: 12.9 G/DL (ref 13.3–17.7)
HGB UR QL STRIP: NEGATIVE
IMM GRANULOCYTES # BLD: 0 10E3/UL
IMM GRANULOCYTES NFR BLD: 0 %
KETONES UR STRIP-MCNC: 20 MG/DL
LACTATE SERPL-SCNC: 0.6 MMOL/L (ref 0.7–2)
LEUKOCYTE ESTERASE UR QL STRIP: NEGATIVE
LYMPHOCYTES # BLD AUTO: 0.9 10E3/UL (ref 0.8–5.3)
LYMPHOCYTES NFR BLD AUTO: 8 %
MAGNESIUM SERPL-MCNC: 2 MG/DL (ref 1.7–2.3)
MCH RBC QN AUTO: 29 PG (ref 26.5–33)
MCHC RBC AUTO-ENTMCNC: 32 G/DL (ref 31.5–36.5)
MCV RBC AUTO: 91 FL (ref 78–100)
MONOCYTES # BLD AUTO: 1 10E3/UL (ref 0–1.3)
MONOCYTES NFR BLD AUTO: 9 %
MUCOUS THREADS #/AREA URNS LPF: PRESENT /LPF
NEUTROPHILS # BLD AUTO: 9 10E3/UL (ref 1.6–8.3)
NEUTROPHILS NFR BLD AUTO: 80 %
NITRATE UR QL: NEGATIVE
NRBC # BLD AUTO: 0 10E3/UL
NRBC BLD AUTO-RTO: 0 /100
OXYHGB MFR BLDV: 88.4 % (ref 70–75)
PCO2 BLDV: 60 MM HG (ref 35–50)
PH BLDV: 7.37 [PH] (ref 7.35–7.45)
PH UR STRIP: 6 [PH] (ref 5–7)
PLATELET # BLD AUTO: 263 10E3/UL (ref 150–450)
PO2 BLDV: 55 MM HG (ref 25–47)
POTASSIUM SERPL-SCNC: 4.1 MMOL/L (ref 3.4–5.3)
PROCALCITONIN SERPL IA-MCNC: 0.05 NG/ML
PROT SERPL-MCNC: 7.3 G/DL (ref 6.4–8.3)
RBC # BLD AUTO: 4.45 10E6/UL (ref 4.4–5.9)
RBC URINE: 2 /HPF
SAO2 % BLDV: 89.9 % (ref 70–75)
SODIUM SERPL-SCNC: 137 MMOL/L (ref 135–145)
SP GR UR STRIP: 1.03 (ref 1–1.03)
UROBILINOGEN UR STRIP-MCNC: <2 MG/DL
WBC # BLD AUTO: 11.2 10E3/UL (ref 4–11)
WBC URINE: 1 /HPF

## 2023-10-05 PROCEDURE — 80053 COMPREHEN METABOLIC PANEL: CPT | Performed by: INTERNAL MEDICINE

## 2023-10-05 PROCEDURE — 120N000001 HC R&B MED SURG/OB

## 2023-10-05 PROCEDURE — 36415 COLL VENOUS BLD VENIPUNCTURE: CPT | Performed by: INTERNAL MEDICINE

## 2023-10-05 PROCEDURE — 94640 AIRWAY INHALATION TREATMENT: CPT

## 2023-10-05 PROCEDURE — 250N000009 HC RX 250: Performed by: FAMILY MEDICINE

## 2023-10-05 PROCEDURE — 250N000013 HC RX MED GY IP 250 OP 250 PS 637: Performed by: PHYSICIAN ASSISTANT

## 2023-10-05 PROCEDURE — 272N000202 HC AEROBIKA WITH MANOMETER

## 2023-10-05 PROCEDURE — 99232 SBSQ HOSP IP/OBS MODERATE 35: CPT | Performed by: FAMILY MEDICINE

## 2023-10-05 PROCEDURE — 92610 EVALUATE SWALLOWING FUNCTION: CPT | Mod: GN

## 2023-10-05 PROCEDURE — 250N000013 HC RX MED GY IP 250 OP 250 PS 637: Performed by: INTERNAL MEDICINE

## 2023-10-05 PROCEDURE — 250N000011 HC RX IP 250 OP 636: Performed by: ANESTHESIOLOGY

## 2023-10-05 PROCEDURE — 85004 AUTOMATED DIFF WBC COUNT: CPT | Performed by: INTERNAL MEDICINE

## 2023-10-05 PROCEDURE — 81001 URINALYSIS AUTO W/SCOPE: CPT | Performed by: INTERNAL MEDICINE

## 2023-10-05 PROCEDURE — 83735 ASSAY OF MAGNESIUM: CPT | Performed by: INTERNAL MEDICINE

## 2023-10-05 PROCEDURE — 82805 BLOOD GASES W/O2 SATURATION: CPT | Performed by: INTERNAL MEDICINE

## 2023-10-05 PROCEDURE — 250N000011 HC RX IP 250 OP 636: Mod: JZ | Performed by: FAMILY MEDICINE

## 2023-10-05 PROCEDURE — 84145 PROCALCITONIN (PCT): CPT | Performed by: INTERNAL MEDICINE

## 2023-10-05 PROCEDURE — 99223 1ST HOSP IP/OBS HIGH 75: CPT | Performed by: INTERNAL MEDICINE

## 2023-10-05 PROCEDURE — 250N000013 HC RX MED GY IP 250 OP 250 PS 637: Performed by: FAMILY MEDICINE

## 2023-10-05 PROCEDURE — 83605 ASSAY OF LACTIC ACID: CPT | Performed by: INTERNAL MEDICINE

## 2023-10-05 PROCEDURE — 94640 AIRWAY INHALATION TREATMENT: CPT | Mod: 76

## 2023-10-05 PROCEDURE — 999N000157 HC STATISTIC RCP TIME EA 10 MIN

## 2023-10-05 PROCEDURE — 87040 BLOOD CULTURE FOR BACTERIA: CPT | Performed by: INTERNAL MEDICINE

## 2023-10-05 RX ORDER — PIPERACILLIN SODIUM, TAZOBACTAM SODIUM 3; .375 G/15ML; G/15ML
3.38 INJECTION, POWDER, LYOPHILIZED, FOR SOLUTION INTRAVENOUS EVERY 8 HOURS
Status: DISCONTINUED | OUTPATIENT
Start: 2023-10-05 | End: 2023-10-05 | Stop reason: DRUGHIGH

## 2023-10-05 RX ORDER — PIPERACILLIN SODIUM, TAZOBACTAM SODIUM 3; .375 G/15ML; G/15ML
3.38 INJECTION, POWDER, LYOPHILIZED, FOR SOLUTION INTRAVENOUS ONCE
Status: COMPLETED | OUTPATIENT
Start: 2023-10-05 | End: 2023-10-05

## 2023-10-05 RX ORDER — MAGNESIUM OXIDE 400 MG/1
400 TABLET ORAL EVERY 4 HOURS
Status: COMPLETED | OUTPATIENT
Start: 2023-10-05 | End: 2023-10-05

## 2023-10-05 RX ORDER — PIPERACILLIN SODIUM, TAZOBACTAM SODIUM 3; .375 G/15ML; G/15ML
3.38 INJECTION, POWDER, LYOPHILIZED, FOR SOLUTION INTRAVENOUS EVERY 8 HOURS
Status: DISCONTINUED | OUTPATIENT
Start: 2023-10-05 | End: 2023-10-06 | Stop reason: HOSPADM

## 2023-10-05 RX ADMIN — PIPERACILLIN AND TAZOBACTAM 3.38 G: 3; .375 INJECTION, POWDER, LYOPHILIZED, FOR SOLUTION INTRAVENOUS at 22:37

## 2023-10-05 RX ADMIN — ACETAMINOPHEN 975 MG: 325 TABLET ORAL at 05:55

## 2023-10-05 RX ADMIN — OXYCODONE HYDROCHLORIDE 10 MG: 5 TABLET ORAL at 04:53

## 2023-10-05 RX ADMIN — IPRATROPIUM BROMIDE AND ALBUTEROL SULFATE 3 ML: .5; 3 SOLUTION RESPIRATORY (INHALATION) at 19:12

## 2023-10-05 RX ADMIN — OXYCODONE HYDROCHLORIDE 10 MG: 5 TABLET ORAL at 00:39

## 2023-10-05 RX ADMIN — OXYCODONE HYDROCHLORIDE 10 MG: 5 TABLET ORAL at 09:52

## 2023-10-05 RX ADMIN — ACETAMINOPHEN 650 MG: 325 TABLET ORAL at 03:59

## 2023-10-05 RX ADMIN — Medication 400 MG: at 05:54

## 2023-10-05 RX ADMIN — Medication 400 MG: at 09:52

## 2023-10-05 RX ADMIN — IPRATROPIUM BROMIDE AND ALBUTEROL SULFATE 3 ML: .5; 3 SOLUTION RESPIRATORY (INHALATION) at 15:39

## 2023-10-05 RX ADMIN — HYDROXYZINE HYDROCHLORIDE 10 MG: 10 TABLET ORAL at 04:53

## 2023-10-05 RX ADMIN — IPRATROPIUM BROMIDE AND ALBUTEROL SULFATE 3 ML: .5; 3 SOLUTION RESPIRATORY (INHALATION) at 11:19

## 2023-10-05 RX ADMIN — LISINOPRIL 5 MG: 5 TABLET ORAL at 09:52

## 2023-10-05 RX ADMIN — PIPERACILLIN AND TAZOBACTAM 3.38 G: 3; .375 INJECTION, POWDER, LYOPHILIZED, FOR SOLUTION INTRAVENOUS at 15:00

## 2023-10-05 RX ADMIN — SENNOSIDES AND DOCUSATE SODIUM 1 TABLET: 50; 8.6 TABLET ORAL at 20:12

## 2023-10-05 RX ADMIN — OXYCODONE HYDROCHLORIDE 10 MG: 5 TABLET ORAL at 13:25

## 2023-10-05 RX ADMIN — PIPERACILLIN AND TAZOBACTAM 3.38 G: 3; .375 INJECTION, POWDER, LYOPHILIZED, FOR SOLUTION INTRAVENOUS at 09:53

## 2023-10-05 RX ADMIN — OXYCODONE HYDROCHLORIDE 10 MG: 5 TABLET ORAL at 19:29

## 2023-10-05 RX ADMIN — THERA TABS 1 TABLET: TAB at 09:52

## 2023-10-05 RX ADMIN — IPRATROPIUM BROMIDE AND ALBUTEROL SULFATE 3 ML: .5; 3 SOLUTION RESPIRATORY (INHALATION) at 07:29

## 2023-10-05 RX ADMIN — GABAPENTIN 100 MG: 100 CAPSULE ORAL at 20:12

## 2023-10-05 RX ADMIN — ACETAMINOPHEN 650 MG: 325 TABLET ORAL at 09:54

## 2023-10-05 RX ADMIN — ACETAMINOPHEN 650 MG: 325 TABLET ORAL at 22:37

## 2023-10-05 RX ADMIN — PROCHLORPERAZINE EDISYLATE 5 MG: 5 INJECTION INTRAMUSCULAR; INTRAVENOUS at 21:19

## 2023-10-05 ASSESSMENT — ACTIVITIES OF DAILY LIVING (ADL)
ADLS_ACUITY_SCORE: 23
ADLS_ACUITY_SCORE: 21
ADLS_ACUITY_SCORE: 21
ADLS_ACUITY_SCORE: 23
ADLS_ACUITY_SCORE: 23
ADLS_ACUITY_SCORE: 21
ADLS_ACUITY_SCORE: 21
ADLS_ACUITY_SCORE: 23
ADLS_ACUITY_SCORE: 21

## 2023-10-05 NOTE — PLAN OF CARE
Vss. Placed on 2L O2 via NC to keep O2 sats >90. A&O. Pain r/t surgical pain and addressed with PRN oxy and atarax. Remains on K and Mg protocols which are a recheck in the AM. Plan to discharge home with family once medically cleared. Will continue to monitor.

## 2023-10-05 NOTE — PROGRESS NOTES
Speech-Language Pathology: Clinical Swallow Evaluation      10/05/23 0869   Appointment Info   Signing Clinician's Name / Credentials (SLP) Ute Lemon MA, CCC-SLP   General Information   Onset of Illness/Injury or Date of Surgery 10/02/23   Referring Physician Mulugeta Fernandez MD   Pertinent History of Current Problem Admitted for C3-C5 ACDF (10/2/23). PMHx is significant for HTN, obesity, ETOH use, R shoulder injury, and L hip chronic pain. Speech Therapy was consulted due to concerns of possible aspiration. Chest CT showed right greater than left atelectasis and some left mainstem bronchus debris.   General Observations Patient alert and cooperative. Soft cervical collar in place.   Type of Evaluation   Type of Evaluation Swallow Evaluation   Oral Motor   Oral Musculature generally intact   Structural Abnormalities none present   Mucosal Quality good   Dentition (Oral Motor)   Comment, Dentition (Oral Motor) Patient does not own dentures.   Dentition (Oral Motor) edentulous   Facial Symmetry (Oral Motor)   Facial Symmetry (Oral Motor) WNL   Lip Function (Oral Motor)   Lip Range of Motion (Oral Motor) WNL   Lip Strength (Oral Motor) WNL   Tongue Function (Oral Motor)   Tongue Strength (Oral Motor) WNL   Tongue Coordination/Speed (Oral Motor) WNL   Tongue ROM (Oral Motor) WNL   Jaw Function (Oral Motor)   Jaw Function (Oral Motor) WNL   Cough/Swallow/Gag Reflex (Oral Motor)   Volitional Throat Clear/Cough (Oral Motor) WNL   Volitional Swallow (Oral Motor) WNL   Vocal Quality/Secretion Management (Oral Motor)   Vocal Quality (Oral Motor) WNL   Secretion Management (Oral Motor) WNL   Comment, Vocal Quality/Secretion Management (Oral Motor) Patient's vocal quality is clear. Minimal to no hoarseness noted.   General Swallowing Observations   Past History of Dysphagia None per patient report. None per review of EMR.   Respiratory Support (General Swallowing Observations) nasal cannula  (4 LPM)   Current Diet/Method  of Nutritional Intake (General Swallowing Observations, NIS) regular diet;thin liquids (level 0)   Swallowing Evaluation Clinical swallow evaluation   Clinical Swallow Evaluation   Feeding Assistance no assistance needed   Clinical Swallow Evaluation Textures Trialed thin liquids;pureed;solid foods   Clinical Swallow Eval: Thin Liquid Texture Trial   Mode of Presentation, Thin Liquids cup;straw;self-fed   Volume of Liquid or Food Presented >4 ounces   Oral Phase of Swallow WFL   Pharyngeal Phase of Swallow intact  (No overt s/sx of aspiration)   Diagnostic Statement No overt clinical s/sx of aspiration observed. Patient denied pain with swallow.   Clinical Swallow Evaluation: Puree Solid Texture Trial   Mode of Presentation, Puree self-fed   Volume of Puree Presented 4 teaspoons   Oral Phase, Puree WFL   Pharyngeal Phase, Puree intact  (No overt s/sx of aspiration)   Diagnostic Statement No overt clinical s/sx of aspiration observed. Patient denied pain with swallow or globus sensation.   Clinical Swallow Evaluation: Solid Food Texture Trial   Mode of Presentation self-fed   Volume Presented 6 bites   Oral Phase WFL   Pharyngeal Phase intact  (No overt s/sx of aspiration)   Diagnostic Statement Mastication was mildly prolonged (due to edentulism), but effective. No oral stasis noted after swallows. Patient denied pain with swallow or globus sensation.   Esophageal Phase of Swallow   Patient reports or presents with symptoms of esophageal dysphagia No   Swallowing Recommendations   Diet Consistency Recommendations regular diet;thin liquids (level 0)   Supervision Level for Intake patient independent   Mode of Delivery Recommendations bolus size, small;slow rate of intake   Recommended Feeding/Eating Techniques (Swallow Eval) maintain upright sitting position for eating   Medication Administration Recommendations, Swallowing (SLP) Per patient preference   Instrumental Assessment Recommendations instrumental evaluation  not recommended at this time   Clinical Impression   Criteria for Skilled Therapeutic Interventions Met (SLP Eval) Evaluation only   Clinical Impression Comments Clinical swallow evaluation completed per MD order. No oral dysphagia observed. There was no direct evidence of pharyngeal dysphagia during this assessment. No overt clinical s/sx of aspiration observed. Patient denies odynophagia or globus sensation. At this time, he appears appropriate to continue current diet of Regular textures and thin liquids with use of standard safe swallowing precautions. Further Speech Therapy is not indicated at this time, however, please re-consult if new concerns arise. While the likelihood of silent aspiration is low, if this is a concern, consider video swallow study.   SLP Total Evaluation Time   Eval: oral/pharyngeal swallow function, clinical swallow Minutes ([id number]) 24   SLP Discharge Planning   SLP Plan No further Speech Therapy needs at this time. Will complete current orders.   SLP Discharge Recommendation home   SLP Rationale for DC Rec Swallow function appears intact.   SLP Brief overview of current status  Patient appears appropriate to continue current diet of Regular textures and thin liquids. He should follow standard safe swallowing precautions (i.e., sitting fully upright for all intake, eating slowly, taking small bites/sips).   Total Session Time   Total Session Time (sum of timed and untimed services) 24

## 2023-10-05 NOTE — PROVIDER NOTIFICATION
Notified MD at 0407 AM regarding new orders.      Spoke with: Dr. Arthur    Orders were obtained.     Comments: Pt having new fever 101.4- treated with prn tylenol. Pt triggered sepsis BPA- do you want me to order lactic acid?

## 2023-10-05 NOTE — PROGRESS NOTES
Orthopedic Progress Note      Assessment: 2 Day Post-Op  S/P Procedure(s):  BILATERAL CERVICAL 3 - CERVICAL 4 AND CERVICAL 4 - CERVICAL 5 ANTERIOR CERVICAL DECOMPRESSION FUSION @[facility]    Plan:   - Continue PT/OT  - Weightbearing status: WBAT can use brace for comfort  - No bending, twisting or lifting more than 10 pounds for 6 weeks.  - Anticoagulation: no chemical prophylaxis in addition to SCDs, felisha stockings and early ambulation.  - Pain control at discharge: Oxycodone 5 mg 1-2 tabs Q4-6 hours x30-32 tabs, Hydroxyzine 25 mg QID PRN, Gabapentin 100 mg TID , Tizanidine 4 mg TID, Acetaminophen 1000 mg TID  - Discharge planning: pending medical clearance        Subjective:  Pain: mild  Chest pain, SOB: no  Nausea, Vomiting:  no  Lightheadedness, Dizziness:  no  Neuro:  Patient denies new onset numbness or paresthesias    Patient is doing well postop day 2.  Ambulating, tolerating oral intake. Patient was cleared by speech therapy.  Pain is under good control. Overnight he spiked a fever and triggered sepsis protocol. Patient's discharge is pending medical clearance.     Objective:  /82 (BP Location: Right arm)   Pulse 85   Temp 98.2  F (36.8  C) (Oral)   Resp 18   Ht 1.829 m (6')   Wt 115.2 kg (253 lb 14.4 oz)   SpO2 94%   BMI 34.44 kg/m    The patient is A&Ox3. Appears comfortable.   Sensation is intact.  Able to make full fist, oppose fingers to thumb and give thumbs up okay sign  Radial pulse intact.  Calves are soft and non-tender. Negative Willard's.  The incision is covered. Dressing C/D/I.      Pertinent Labs   Lab Results: personally reviewed.   No results found for: INR, PROTIME  Lab Results   Component Value Date    WBC 11.2 (H) 10/05/2023    HGB 12.9 (L) 10/05/2023    HCT 40.3 10/05/2023    MCV 91 10/05/2023     10/05/2023     Lab Results   Component Value Date     10/05/2023    CO2 32 (H) 10/05/2023         Report completed by:  Kylee Rashid PA-C/Dr. Denise Niño  Orthopedics    Date: 10/3/2023  Time: 11:11 AM

## 2023-10-05 NOTE — PLAN OF CARE
Problem: Plan of Care - These are the overarching goals to be used throughout the patient stay.    Goal: Plan of Care Review  Outcome: Progressing     Problem: Pain Acute  Goal: Optimal Pain Control and Function  Outcome: Progressing  Intervention: Develop Pain Management Plan  Recent Flowsheet Documentation  Taken 10/4/2023 2355 by Zenaida Shepard RN  Pain Management Interventions: rest  Taken 10/4/2023 2029 by Zenaida Shepard RN  Pain Management Interventions: medication (see MAR)  Intervention: Prevent or Manage Pain  Recent Flowsheet Documentation  Taken 10/4/2023 2355 by Zenaida Shepard RN  Medication Review/Management: medications reviewed  Taken 10/4/2023 2029 by Zenaida Shepard RN  Medication Review/Management: medications reviewed   Goal Outcome Evaluation:  Patient A&O x4, able to make needs known. Temp of 101.3 at 0350, PRN Tylenol administered, recheck temp of 100.7. Elevated HR. Sepsis protocol flagged. On 4L O2 via NC. Pain rated 9/10 at incisions site. Pain managed with scheduled Tylenol and PRN Oxycodone and Atarax. Left PIV SL. CMS intact. Surgical dressing CDI. Soft neck brace on for patient comfort. Encouraging IS use. ACHS sugar checks, insulin coverage per sliding scale. K/Mg protocol, recheck in the morning. Regular diet, tolerating well. Stand by assist [facility] cane. Discharge pending.

## 2023-10-05 NOTE — PROGRESS NOTES
[facility] MEDICINE  PROGRESS NOTE     Code Status: Full Code  Procedure(s):  BILATERAL CERVICAL 3 - CERVICAL 4 AND CERVICAL 4 - CERVICAL 5 ANTERIOR CERVICAL DECOMPRESSION FUSION  3 Days Post-Op  Identification/Summary:   Varun Crowder is a 68 year old male with a PMH of cervical disc disease and alcohol overuse otherwise generally healthy.  At preop blood pressures were elevated.   Denies any prior history of high blood pressure or being on an antihypertensive.  Was cleared for surgery.  10/2 underwent cervical fusion.  Pre and postoperatively patient has been quite hypertensive.  Hospitalist service consulted.  Responding well to lisinopril 5 mg daily.  Having some hyperglycemia and patient now relates a history of prediabetes.  A1c 6.4.  Blood sugars under reasonable control.  However also persistently hypoxic.  Relates a history of heavy snoring.  Utilize incentive spirometry.  Chest CT negative for PE but does show right greater than left atelectasis and some left mainstem bronchus debris.  Speech therapy cleared the patient.  Wheezing improved with DuoNebs.  10/5 early a.m. developed temperature 101.4.  Initiated on Zosyn.  Appreciate pulmonary consultation.  Weaning off oxygen.  If continues to do well hopeful discharge 10/6.     Assessment and Plan:     Status post cervical fusion  Dysphagia  Fever  Postoperative management per surgery.  As best I can determine no preoperative laboratory work was done.  Per report did have some swallowing issues  but has improved.  10/5 early a.m. developed temperature of 101.4.  Responded well to Tylenol.  Procalcitonin mildly elevated.  Initiated on Zosyn  Speech therapy has cleared for swallowing.  Acute blood loss anemia  Postoperative hemoglobin 13.2.  No indication for transfusion at this time.  Follow per protocols.  Acute hypoxemic respiratory failure  Snoring history  Since surgery has been requiring supplemental oxygen.   Possible candidate for undiagnosed sleep apnea.  Admits to a snoring history.  Utilize incentive spirometry.  Failing to wean off of oxygen.    Chest CT negative for pulmonary embolism.  Did show right greater than left atelectasis and left mainstem bronchial debris.  10/4 wheezing noted on exam.  Proved after DuoNebs 4 times daily scheduled.  Hypertension  Blood pressure pre and postoperatively has consistently been elevated.  Highest reading 183/112.  Preoperative blood pressure was 172/100.  Not chronically on any medications.  Denies a prior history of blood pressure or having recommendations for taking medications for this.  Blood work relatively unremarkable.  Responding well to lisinopril 5 mg daily with reasonable improvement to his blood pressure.  Hydralazine available for severe elevations.  Prediabetes  Hyperglycemia  Postoperative glucose 159.  A1c 6.4.  On further discussion with the patient he admits to being told he has prediabetes.  Follow blood sugars 4 times a day.  Insulin sliding scale standard range.  Reasonable glucose numbers.  Given printed diabetic diet information.  Alcohol overuse  Regularly drinks 0-8 beverages per day.  Noted.  No evidence for withdrawal.     Anticoagulation.  SCDs ordered by surgery.  Standard postsurgical risk.  COVID-19 PCR unknown  Nurses to screen.  Fluids: Per surgery  Pain meds: Per surgery  Therapy: Per surgery     Martinez:Not present  Lines: None       Current Diet  Orders Placed This Encounter      Discharge Instruction - Regular Diet Adult      Advance Diet as Tolerated: Regular Diet Adult    Supplements  None    Barriers to Discharge: Hypoxia, IV antibiotics    Disposition: Hopeful discharge 10/6    Clinically Significant Risk Factors                  # Hypertension: Noted on problem list        # Obesity: Estimated body mass index is 34.44 kg/m  as calculated from the following:    Height as of this encounter: 1.829 m (6').    Weight as of this encounter:  115.2 kg (253 lb 14.4 oz)., PRESENT ON ADMISSION            Interval History/Subjective:  Patient spiked a temperature this morning of 101.4.  Responded well to Tylenol.  Feeling better today.  Weaning down on the oxygen.  Did well with speech therapy.  Pain under improved control.  Patient is in better spirits.  Spoke with his wife via telephone.  Multiple questions answered to verbalized satisfaction.      Last 24H PRN:      acetaminophen (TYLENOL) tablet 650 mg, 650 mg at 10/05/23 0954     hydrOXYzine (ATARAX) tablet 10 mg, 10 mg at 10/05/23 0453     oxyCODONE (ROXICODONE) tablet 5 mg **OR** oxyCODONE (ROXICODONE) tablet 10 mg, 10 mg at 10/05/23 0952    Physical Exam/Objective:  Temp:  [98.2  F (36.8  C)-101.4  F (38.6  C)] 98.7  F (37.1  C)  Pulse:  [] 85  Resp:  [16-20] 18  BP: (122-140)/(69-80) 126/71  SpO2:  [92 %-97 %] 94 %  Wt Readings from Last 4 Encounters:   10/05/23 115.2 kg (253 lb 14.4 oz)     Body mass index is 34.44 kg/m .    Constitutional: awake, alert, cooperative, no apparent distress, and appears stated age  ENT: Limited exam secondary to neck brace otherwise, Normocephalic, without obvious abnormality, atraumatic, external ears without lesions, oral pharynx with moist mucous membranes, tonsils without erythema or exudates, gums normal and good dentition.  Respiratory: No increased work of breathing, good air exchange, clear to auscultation bilaterally, no crackles or wheezing  Cardiovascular: Normal apical impulse, regular rate and rhythm, normal S1 and S2, no S3 or S4, and no murmur noted  GI: No scars, normal bowel sounds, soft, non-distended, non-tender, no masses palpated, no hepatosplenomegally  Skin: normal skin color, texture, turgor, no redness, warmth, or swelling, and no rashes  Musculoskeletal: There is no redness, warmth, or swelling of the joints.  Motor strength is 5 out of 5 all extremities bilaterally.  Tone is normal. no lower extremity pitting edema present  Neurologic:  Cranial nerves II-XII are grossly intact. Sensory:  Sensory intact  Neuropsychiatric: General: normal, calm, and normal eye contact Level of consciousness: alert / normal Affect: normal Orientation: oriented to self, place, time and situation Memory and insight: normal, memory for past and recent events intact, and thought process normal      Medications:   Personally Reviewed.  Medications       gabapentin  100 mg Oral At Bedtime     insulin aspart  1-7 Units Subcutaneous TID AC     insulin aspart  1-5 Units Subcutaneous At Bedtime     ipratropium - albuterol 0.5 mg/2.5 mg/3 mL  3 mL Nebulization 4x daily     lisinopril  5 mg Oral Daily     multivitamin, therapeutic  1 tablet Oral Daily     piperacillin-tazobactam  3.375 g Intravenous Q8H     polyethylene glycol  17 g Oral Daily     senna-docusate  1 tablet Oral BID       Data reviewed today: I personally reviewed all new medications, labs, imaging/diagnostics reports over the past 24 hours. Pertinent findings include:    Imaging:   No results found for this or any previous visit (from the past 24 hour(s)).    Labs:  CT Chest Pulmonary Embolism w Contrast   Final Result   IMPRESSION:   1.  No pulmonary embolism or acute aortic pathology.   2.  Right greater than left lower lobe atelectasis.      XR Surgery DIALLO  Fluoro G/T 5 Min   Final Result        Recent Results (from the past 24 hour(s))   Glucose by meter    Collection Time: 10/04/23 12:52 PM   Result Value Ref Range    GLUCOSE BY METER POCT 119 (H) 70 - 99 mg/dL   Glucose by meter    Collection Time: 10/04/23  6:06 PM   Result Value Ref Range    GLUCOSE BY METER POCT 109 (H) 70 - 99 mg/dL   Glucose by meter    Collection Time: 10/04/23  8:29 PM   Result Value Ref Range    GLUCOSE BY METER POCT 139 (H) 70 - 99 mg/dL   Comprehensive metabolic panel    Collection Time: 10/05/23  4:31 AM   Result Value Ref Range    Sodium 137 135 - 145 mmol/L    Potassium 4.1 3.4 - 5.3 mmol/L    Carbon Dioxide (CO2) 32 (H) 22 -  29 mmol/L    Anion Gap 8 7 - 15 mmol/L    Urea Nitrogen 10.2 8.0 - 23.0 mg/dL    Creatinine 0.54 (L) 0.67 - 1.17 mg/dL    GFR Estimate >90 >60 mL/min/1.73m2    Calcium 8.8 8.8 - 10.2 mg/dL    Chloride 97 (L) 98 - 107 mmol/L    Glucose 123 (H) 70 - 99 mg/dL    Alkaline Phosphatase 95 40 - 129 U/L    AST 30 0 - 45 U/L    ALT 16 0 - 70 U/L    Protein Total 7.3 6.4 - 8.3 g/dL    Albumin 3.8 3.5 - 5.2 g/dL    Bilirubin Total 0.4 <=1.2 mg/dL   Magnesium    Collection Time: 10/05/23  4:31 AM   Result Value Ref Range    Magnesium 2.0 1.7 - 2.3 mg/dL   Lactic Acid STAT    Collection Time: 10/05/23  4:31 AM   Result Value Ref Range    Lactic Acid 0.6 (L) 0.7 - 2.0 mmol/L   Blood gas venous    Collection Time: 10/05/23  4:31 AM   Result Value Ref Range    pH Venous 7.37 7.35 - 7.45    pCO2 Venous 60 (H) 35 - 50 mm Hg    pO2 Venous 55 (H) 25 - 47 mm Hg    Bicarbonate Venous 35 (H) 24 - 30 mmol/L    Base Excess/Deficit 9.8   mmol/L    Oxyhemoglobin Venous 88.4 (H) 70.0 - 75.0 %    O2 Sat, Venous 89.9 (H) 70.0 - 75.0 %   CBC with platelets and differential    Collection Time: 10/05/23  4:31 AM   Result Value Ref Range    WBC Count 11.2 (H) 4.0 - 11.0 10e3/uL    RBC Count 4.45 4.40 - 5.90 10e6/uL    Hemoglobin 12.9 (L) 13.3 - 17.7 g/dL    Hematocrit 40.3 40.0 - 53.0 %    MCV 91 78 - 100 fL    MCH 29.0 26.5 - 33.0 pg    MCHC 32.0 31.5 - 36.5 g/dL    RDW 13.9 10.0 - 15.0 %    Platelet Count 263 150 - 450 10e3/uL    % Neutrophils 80 %    % Lymphocytes 8 %    % Monocytes 9 %    Mids % (Monos, Eos, Basos)      % Eosinophils 3 %    % Basophils 0 %    % Immature Granulocytes 0 %    NRBCs per 100 WBC 0 <1 /100    Absolute Neutrophils 9.0 (H) 1.6 - 8.3 10e3/uL    Absolute Lymphocytes 0.9 0.8 - 5.3 10e3/uL    Absolute Monocytes 1.0 0.0 - 1.3 10e3/uL    Mids Abs (Monos, Eos, Basos)      Absolute Eosinophils 0.3 0.0 - 0.7 10e3/uL    Absolute Basophils 0.0 0.0 - 0.2 10e3/uL    Absolute Immature Granulocytes 0.0 <=0.4 10e3/uL    Absolute NRBCs  0.0 10e3/uL   UA with Microscopic reflex to Culture    Collection Time: 10/05/23  5:34 AM    Specimen: Urine, Midstream   Result Value Ref Range    Color Urine Yellow Colorless, Straw, Light Yellow, Yellow    Appearance Urine Clear Clear    Glucose Urine Negative Negative mg/dL    Bilirubin Urine Negative Negative    Ketones Urine 20 (A) Negative mg/dL    Specific Gravity Urine 1.029 1.001 - 1.030    Blood Urine Negative Negative    pH Urine 6.0 5.0 - 7.0    Protein Albumin Urine 20 (A) Negative mg/dL    Urobilinogen Urine <2.0 <2.0 mg/dL    Nitrite Urine Negative Negative    Leukocyte Esterase Urine Negative Negative    Mucus Urine Present (A) None Seen /LPF    RBC Urine 2 <=2 /HPF    WBC Urine 1 <=5 /HPF   Glucose by meter    Collection Time: 10/05/23  8:11 AM   Result Value Ref Range    GLUCOSE BY METER POCT 107 (H) 70 - 99 mg/dL       Pending Labs:  Unresulted Labs Ordered in the Past 30 Days of this Admission       Date and Time Order Name Status Description    10/5/2023  4:11 AM Blood Culture Peripheral Blood In process     10/5/2023  4:11 AM Blood Culture Peripheral Blood In process               Mulugeta Fernandez MD  [facility]  Phone: #[phone]

## 2023-10-05 NOTE — CONSULTS
PULMONARY/CRITICAL CARE CONSULT NOTE    Date / Time of Admission:  10/2/2023  7:16 AM  Assessment:   68yoM with history of obesity, BMI 34 now with post-operative hypoxia in setting of hypoventilation with concerns for SILVIA likely exacerbated by narcotic administration and new fever. Bibasilar findings on CT more consistent with atelectasis but with fever cannot rule out pneumonia.    Clinically Significant Risk Factors                  # Hypertension: Noted on problem list        # Obesity: Estimated body mass index is 34.44 kg/m  as calculated from the following:    Height as of this encounter: 1.829 m (6').    Weight as of this encounter: 115.2 kg (253 lb 14.4 oz)., PRESENT ON ADMISSION                 Principal Problem:    Lumbar spinal stenosis  Active Problems:    HTN (hypertension)    Alcohol use, unspecified, uncomplicated    Prediabetes    Snoring    Acute hypoxemic respiratory failure (H)          Plan:   Agree with Zosyn  Agree with swallow evaluation although likely negative for aspiration  Sputum sample  Procalcitonin pending  Outpatient sleep study. Stressed importance of this. Patient may develop obesity hypoventilation syndrome if not improving ventilation.   IS/flutter valve encouraged  Wean O2 for sats 88-92%  Mobilization as able which will improve ventilation.              Subjective:    cc: hypoxia    HPI: 68 year old male without significant medical history here for elective cervical fusion and disckectomy now complicated by hypoxia and fever.  Patient denies pulmonary history, no dyspnea prior to admission. Never had pFTs or seen pulmonary. Has been told that he may had SILVIA but never pursued PSG.    Surgery was 10/2. He has had persistent O2 requirement post-operatively. This worsens during sleep increasing concern for SILVIA.   He continues on 4L O2 with minimal cough. CTA found no PE but bilateral atelecasis and debris in airway. Does report shortness of breath that he did not note prior to  surgery. No pleuritic pain causing splinting or hypoventilation.     History reviewed. No pertinent past medical history.    Social History     Tobacco Use    Smoking status: Former     Types: Cigarettes    Smokeless tobacco: Never   Substance Use Topics    Alcohol use: Yes     Comment: 3 times a week, few beers       History reviewed. No pertinent family history.    Current Facility-Administered Medications   Medication    acetaminophen (TYLENOL) tablet 650 mg    benzocaine-menthol (CEPACOL) 15-3.6 MG lozenge 1 lozenge    bisacodyl (DULCOLAX) suppository 10 mg    glucose gel 15-30 g    Or    dextrose 50 % injection 25-50 mL    Or    glucagon injection 1 mg    gabapentin (NEURONTIN) capsule 100 mg    hydrALAZINE (APRESOLINE) injection 10 mg    HYDROmorphone (DILAUDID) injection 0.2 mg    Or    HYDROmorphone (DILAUDID) injection 0.4 mg    hydrOXYzine (ATARAX) tablet 10 mg    insulin aspart (NovoLOG) injection (RAPID ACTING)    insulin aspart (NovoLOG) injection (RAPID ACTING)    ipratropium - albuterol 0.5 mg/2.5 mg/3 mL (DUONEB) neb solution 3 mL    lisinopril (ZESTRIL) tablet 5 mg    loratadine (CLARITIN) tablet 10 mg    magnesium hydroxide (MILK OF MAGNESIA) suspension 30 mL    multivitamin, therapeutic (THERA-VIT) tablet 1 tablet    naloxone (NARCAN) injection 0.2 mg    Or    naloxone (NARCAN) injection 0.4 mg    Or    naloxone (NARCAN) injection 0.2 mg    Or    naloxone (NARCAN) injection 0.4 mg    ondansetron (ZOFRAN ODT) ODT tab 4 mg    Or    ondansetron (ZOFRAN) injection 4 mg    oxyCODONE (ROXICODONE) tablet 5 mg    Or    oxyCODONE (ROXICODONE) tablet 10 mg    oxyCODONE (ROXICODONE) tablet 5 mg    piperacillin-tazobactam (ZOSYN) 3.375 g vial to attach to  mL bag    polyethylene glycol (MIRALAX) Packet 17 g    prochlorperazine (COMPAZINE) injection 5 mg    prochlorperazine (COMPAZINE) tablet 5 mg    senna-docusate (SENOKOT-S/PERICOLACE) 8.6-50 MG per tablet 1 tablet         Review of Systems: 12-point  Review performed and negative aside from that noted in HPI.    Objective:    Vital signs:  /71 (BP Location: Right arm)   Pulse 85   Temp 98.7  F (37.1  C) (Oral)   Resp 18   Ht 1.829 m (6')   Wt 115.2 kg (253 lb 14.4 oz)   SpO2 94%   BMI 34.44 kg/m      GENERAL APPEARANCE: healthy, alert and no distress     EYES: EOMI, - PERRL     NECK: no adenopathy, no asymmetry, masses, or scars and thyroid normal to palpation     RESP: lungs clear to auscultation - no rales, rhonchi or wheezes     CV: regular rates and rhythm, normal S1 S2, no S3 or S4 and no murmur, click or rub -     ABDOMEN:  soft, nontender, no HSM or masses and bowel sounds normal     MS: extremities normal- no gross deformities noted, no evidence of inflammation in joints, FROM in all extremities.     SKIN: no suspicious lesions or rashes     NEURO: Normal strength and tone, sensory exam grossly normal, mentation intact and speech normal     PSYCH: mentation appears normal. and affect normal/bright     LYMPHATICS: No axillary, cervical, inguinal, or supraclavicular nodes        Data    CT chest: personally reviewed  EXAM: CT CHEST PULMONARY EMBOLISM W CONTRAST  LOCATION: [facility]  DATE: 10/4/2023     INDICATION: persistant postoperative hypoxia  COMPARISON: None  TECHNIQUE: CT chest pulmonary angiogram during arterial phase injection of IV contrast. Multiplanar reformats and MIP reconstructions were performed. Dose reduction techniques were used.   CONTRAST: Isovue 370 75mL     FINDINGS:  ANGIOGRAM CHEST: Pulmonary arteries are normal caliber and negative for pulmonary emboli. Thoracic aorta is negative for dissection. No CT evidence of right heart strain.     LUNGS AND PLEURA: Trace debris within the left mainstem bronchus. Right greater than left lower lobe atelectasis. No areas of consolidation. No pleural effusion.     MEDIASTINUM/AXILLAE: Mild left ventricular hypertrophy. No pericardial effusion.  Nonaneurysmal thoracic aorta with scattered atherosclerotic disease. No thoracic lymphadenopathy.     CORONARY ARTERY CALCIFICATION: Moderate.     UPPER ABDOMEN: Probable mild hepatic steatosis.     MUSCULOSKELETAL: No aggressive osseous lesions.                                                                      IMPRESSION:  1.  No pulmonary embolism or acute aortic pathology.  2.  Right greater than left lower lobe atelectasis.    Laboratory:  Results for orders placed or performed during the hospital encounter of 10/02/23   Basic metabolic panel   Result Value Ref Range    Sodium 139 135 - 145 mmol/L    Potassium 4.1 3.4 - 5.3 mmol/L    Chloride 103 98 - 107 mmol/L    Carbon Dioxide (CO2) 28 22 - 29 mmol/L    Anion Gap 8 7 - 15 mmol/L    Urea Nitrogen 16.3 8.0 - 23.0 mg/dL    Creatinine 0.72 0.67 - 1.17 mg/dL    GFR Estimate >90 >60 mL/min/1.73m2    Calcium 8.4 (L) 8.8 - 10.2 mg/dL    Glucose 159 (H) 70 - 99 mg/dL     Lab Results   Component Value Date    WBC 11.2 (H) 10/05/2023    HGB 12.9 (L) 10/05/2023    HCT 40.3 10/05/2023    MCV 91 10/05/2023     10/05/2023

## 2023-10-05 NOTE — PROGRESS NOTES
Fever of 101.4     He is s/p cervical fusion 10/2    Plans - per RN, triggered BPA for sepsis. Check CBC/cultures. Check UA. Encourage IS

## 2023-10-06 ENCOUNTER — APPOINTMENT (OUTPATIENT)
Dept: CARDIOLOGY | Facility: CLINIC | Age: 68
DRG: 471 | End: 2023-10-06
Attending: FAMILY MEDICINE
Payer: COMMERCIAL

## 2023-10-06 VITALS
SYSTOLIC BLOOD PRESSURE: 160 MMHG | WEIGHT: 253.9 LBS | DIASTOLIC BLOOD PRESSURE: 95 MMHG | RESPIRATION RATE: 18 BRPM | OXYGEN SATURATION: 92 % | BODY MASS INDEX: 34.39 KG/M2 | TEMPERATURE: 98.1 F | HEART RATE: 101 BPM | HEIGHT: 72 IN

## 2023-10-06 PROBLEM — J69.0 ASPIRATION PNEUMONIA OF LEFT LOWER LOBE (H): Status: ACTIVE | Noted: 2023-10-06

## 2023-10-06 LAB
ALBUMIN SERPL BCG-MCNC: 3.8 G/DL (ref 3.5–5.2)
ALP SERPL-CCNC: 93 U/L (ref 40–129)
ALT SERPL W P-5'-P-CCNC: 25 U/L (ref 0–70)
ANION GAP SERPL CALCULATED.3IONS-SCNC: 8 MMOL/L (ref 7–15)
AST SERPL W P-5'-P-CCNC: 35 U/L (ref 0–45)
BASE EXCESS BLDV CALC-SCNC: 9.8 MMOL/L
BASO+EOS+MONOS # BLD AUTO: NORMAL 10*3/UL
BASO+EOS+MONOS NFR BLD AUTO: NORMAL %
BASOPHILS # BLD AUTO: 0 10E3/UL (ref 0–0.2)
BASOPHILS NFR BLD AUTO: 0 %
BILIRUB SERPL-MCNC: 0.5 MG/DL
BUN SERPL-MCNC: 14.9 MG/DL (ref 8–23)
CALCIUM SERPL-MCNC: 9.1 MG/DL (ref 8.8–10.2)
CHLORIDE SERPL-SCNC: 96 MMOL/L (ref 98–107)
CREAT SERPL-MCNC: 0.6 MG/DL (ref 0.67–1.17)
DEPRECATED HCO3 PLAS-SCNC: 32 MMOL/L (ref 22–29)
EGFRCR SERPLBLD CKD-EPI 2021: >90 ML/MIN/1.73M2
EOSINOPHIL # BLD AUTO: 0.3 10E3/UL (ref 0–0.7)
EOSINOPHIL NFR BLD AUTO: 3 %
ERYTHROCYTE [DISTWIDTH] IN BLOOD BY AUTOMATED COUNT: 13.9 % (ref 10–15)
GLUCOSE BLDC GLUCOMTR-MCNC: 125 MG/DL (ref 70–99)
GLUCOSE SERPL-MCNC: 113 MG/DL (ref 70–99)
HCO3 BLDV-SCNC: 35 MMOL/L (ref 24–30)
HCT VFR BLD AUTO: 41.1 % (ref 40–53)
HGB BLD-MCNC: 13.3 G/DL (ref 13.3–17.7)
IMM GRANULOCYTES # BLD: 0 10E3/UL
IMM GRANULOCYTES NFR BLD: 0 %
LYMPHOCYTES # BLD AUTO: 1 10E3/UL (ref 0.8–5.3)
LYMPHOCYTES NFR BLD AUTO: 11 %
MAGNESIUM SERPL-MCNC: 2.5 MG/DL (ref 1.7–2.3)
MCH RBC QN AUTO: 29.4 PG (ref 26.5–33)
MCHC RBC AUTO-ENTMCNC: 32.4 G/DL (ref 31.5–36.5)
MCV RBC AUTO: 91 FL (ref 78–100)
MONOCYTES # BLD AUTO: 0.9 10E3/UL (ref 0–1.3)
MONOCYTES NFR BLD AUTO: 10 %
NEUTROPHILS # BLD AUTO: 7 10E3/UL (ref 1.6–8.3)
NEUTROPHILS NFR BLD AUTO: 76 %
NRBC # BLD AUTO: 0 10E3/UL
NRBC BLD AUTO-RTO: 0 /100
NT-PROBNP SERPL-MCNC: 75 PG/ML (ref 0–900)
OXYHGB MFR BLDV: 82.6 % (ref 70–75)
PCO2 BLDV: 55 MM HG (ref 35–50)
PH BLDV: 7.41 [PH] (ref 7.35–7.45)
PLATELET # BLD AUTO: 273 10E3/UL (ref 150–450)
PO2 BLDV: 47 MM HG (ref 25–47)
POTASSIUM SERPL-SCNC: 4.3 MMOL/L (ref 3.4–5.3)
PROT SERPL-MCNC: 7.6 G/DL (ref 6.4–8.3)
RBC # BLD AUTO: 4.53 10E6/UL (ref 4.4–5.9)
SAO2 % BLDV: 84 % (ref 70–75)
SODIUM SERPL-SCNC: 136 MMOL/L (ref 135–145)
TROPONIN T SERPL HS-MCNC: 19 NG/L
WBC # BLD AUTO: 9.2 10E3/UL (ref 4–11)

## 2023-10-06 PROCEDURE — 250N000013 HC RX MED GY IP 250 OP 250 PS 637: Performed by: FAMILY MEDICINE

## 2023-10-06 PROCEDURE — 84484 ASSAY OF TROPONIN QUANT: CPT | Performed by: INTERNAL MEDICINE

## 2023-10-06 PROCEDURE — 94640 AIRWAY INHALATION TREATMENT: CPT

## 2023-10-06 PROCEDURE — 99233 SBSQ HOSP IP/OBS HIGH 50: CPT | Performed by: FAMILY MEDICINE

## 2023-10-06 PROCEDURE — 250N000009 HC RX 250: Performed by: FAMILY MEDICINE

## 2023-10-06 PROCEDURE — 93306 TTE W/DOPPLER COMPLETE: CPT | Mod: 26 | Performed by: INTERNAL MEDICINE

## 2023-10-06 PROCEDURE — 85025 COMPLETE CBC W/AUTO DIFF WBC: CPT | Performed by: INTERNAL MEDICINE

## 2023-10-06 PROCEDURE — 250N000013 HC RX MED GY IP 250 OP 250 PS 637: Performed by: PHYSICIAN ASSISTANT

## 2023-10-06 PROCEDURE — 99233 SBSQ HOSP IP/OBS HIGH 50: CPT | Performed by: INTERNAL MEDICINE

## 2023-10-06 PROCEDURE — 80053 COMPREHEN METABOLIC PANEL: CPT | Performed by: INTERNAL MEDICINE

## 2023-10-06 PROCEDURE — C8929 TTE W OR WO FOL WCON,DOPPLER: HCPCS

## 2023-10-06 PROCEDURE — 94799 UNLISTED PULMONARY SVC/PX: CPT

## 2023-10-06 PROCEDURE — 82805 BLOOD GASES W/O2 SATURATION: CPT | Performed by: INTERNAL MEDICINE

## 2023-10-06 PROCEDURE — 83880 ASSAY OF NATRIURETIC PEPTIDE: CPT | Performed by: INTERNAL MEDICINE

## 2023-10-06 PROCEDURE — 250N000011 HC RX IP 250 OP 636: Mod: JZ | Performed by: FAMILY MEDICINE

## 2023-10-06 PROCEDURE — 36415 COLL VENOUS BLD VENIPUNCTURE: CPT | Performed by: INTERNAL MEDICINE

## 2023-10-06 PROCEDURE — 999N000157 HC STATISTIC RCP TIME EA 10 MIN

## 2023-10-06 PROCEDURE — 83735 ASSAY OF MAGNESIUM: CPT | Performed by: INTERNAL MEDICINE

## 2023-10-06 PROCEDURE — 255N000002 HC RX 255 OP 636: Performed by: ORTHOPAEDIC SURGERY

## 2023-10-06 RX ORDER — IPRATROPIUM BROMIDE AND ALBUTEROL SULFATE 2.5; .5 MG/3ML; MG/3ML
3 SOLUTION RESPIRATORY (INHALATION)
Qty: 90 ML | Refills: 0 | Status: SHIPPED | OUTPATIENT
Start: 2023-10-06

## 2023-10-06 RX ORDER — HYDROXYZINE HYDROCHLORIDE 10 MG/1
10 TABLET, FILM COATED ORAL EVERY 6 HOURS PRN
Qty: 30 TABLET | Refills: 0 | Status: SHIPPED | OUTPATIENT
Start: 2023-10-06

## 2023-10-06 RX ORDER — GABAPENTIN 100 MG/1
100 CAPSULE ORAL AT BEDTIME
Qty: 60 CAPSULE | Refills: 0 | Status: SHIPPED | OUTPATIENT
Start: 2023-10-06

## 2023-10-06 RX ORDER — IPRATROPIUM BROMIDE AND ALBUTEROL SULFATE 2.5; .5 MG/3ML; MG/3ML
3 SOLUTION RESPIRATORY (INHALATION)
Status: DISCONTINUED | OUTPATIENT
Start: 2023-10-06 | End: 2023-10-06 | Stop reason: HOSPADM

## 2023-10-06 RX ORDER — OXYCODONE HYDROCHLORIDE 5 MG/1
5 TABLET ORAL EVERY 4 HOURS PRN
Qty: 20 TABLET | Refills: 0 | Status: SHIPPED | OUTPATIENT
Start: 2023-10-06

## 2023-10-06 RX ORDER — LISINOPRIL 5 MG/1
5 TABLET ORAL DAILY
Qty: 30 TABLET | Refills: 0 | Status: SHIPPED | OUTPATIENT
Start: 2023-10-07

## 2023-10-06 RX ORDER — ACETAMINOPHEN 325 MG/1
650 TABLET ORAL EVERY 4 HOURS PRN
Qty: 200 TABLET | Refills: 0 | Status: SHIPPED | OUTPATIENT
Start: 2023-10-06

## 2023-10-06 RX ORDER — AMOXICILLIN 250 MG
1 CAPSULE ORAL 2 TIMES DAILY
Qty: 60 TABLET | Refills: 0 | Status: SHIPPED | OUTPATIENT
Start: 2023-10-06

## 2023-10-06 RX ADMIN — OXYCODONE HYDROCHLORIDE 10 MG: 5 TABLET ORAL at 01:11

## 2023-10-06 RX ADMIN — THERA TABS 1 TABLET: TAB at 10:20

## 2023-10-06 RX ADMIN — HYDROXYZINE HYDROCHLORIDE 10 MG: 10 TABLET ORAL at 15:32

## 2023-10-06 RX ADMIN — OXYCODONE HYDROCHLORIDE 10 MG: 5 TABLET ORAL at 05:15

## 2023-10-06 RX ADMIN — SENNOSIDES AND DOCUSATE SODIUM 1 TABLET: 50; 8.6 TABLET ORAL at 10:20

## 2023-10-06 RX ADMIN — PERFLUTREN 3 ML: 6.52 INJECTION, SUSPENSION INTRAVENOUS at 11:48

## 2023-10-06 RX ADMIN — LISINOPRIL 5 MG: 5 TABLET ORAL at 10:20

## 2023-10-06 RX ADMIN — OXYCODONE HYDROCHLORIDE 10 MG: 5 TABLET ORAL at 10:19

## 2023-10-06 RX ADMIN — IPRATROPIUM BROMIDE AND ALBUTEROL SULFATE 3 ML: .5; 3 SOLUTION RESPIRATORY (INHALATION) at 08:51

## 2023-10-06 RX ADMIN — MAGNESIUM HYDROXIDE 30 ML: 400 SUSPENSION ORAL at 05:19

## 2023-10-06 RX ADMIN — ACETAMINOPHEN 650 MG: 325 TABLET ORAL at 05:15

## 2023-10-06 RX ADMIN — HYDROXYZINE HYDROCHLORIDE 10 MG: 10 TABLET ORAL at 06:28

## 2023-10-06 RX ADMIN — OXYCODONE HYDROCHLORIDE 10 MG: 5 TABLET ORAL at 15:33

## 2023-10-06 RX ADMIN — PIPERACILLIN AND TAZOBACTAM 3.38 G: 3; .375 INJECTION, POWDER, LYOPHILIZED, FOR SOLUTION INTRAVENOUS at 06:22

## 2023-10-06 ASSESSMENT — ACTIVITIES OF DAILY LIVING (ADL)
ADLS_ACUITY_SCORE: 21
ADLS_ACUITY_SCORE: 23
ADLS_ACUITY_SCORE: 21
ADLS_ACUITY_SCORE: 23
ADLS_ACUITY_SCORE: 21

## 2023-10-06 NOTE — PROVIDER NOTIFICATION
10/06/23 0900   RCAT Assessment   Reason for Assessment   (post op)   Pulmonary Status 0   Surgical Status 1   Chest X-ray 1  (atalectasis R>L 3 days ago)   Respiratory Pattern 0   Mental Status 0   Breath Sounds 1   Cough Effectiveness 0   Level of Activity 0   O2 Required for SpO2>=92% 1   Acuity Level (points) 4   Acuity Level  5     Pt states he smoked for 10 years- quit 40 years ago. No inhalers, oxygen, or nebulizers at home.  BS diminished, non-productive cough. Pt educated and encouraged to do IS, Flutter, and get out of bed when able, pt understands and will work on his own.

## 2023-10-06 NOTE — PLAN OF CARE
Patient vital signs are at baseline: Yes  Patient able to ambulate as they were prior to admission or with assist devices provided by therapies during their stay:  Yes  Patient MUST void prior to discharge:  Yes  Patient able to tolerate oral intake:  Yes  Pain has adequate pain control using Oral analgesics:  No,  Reason:  patient is having severe pain  Does patient have an identified :  Yes  Has goal D/C date and time been discussed with patient:  Yes

## 2023-10-06 NOTE — PROGRESS NOTES
[facility] MEDICINE  PROGRESS NOTE     Code Status: Full Code  Procedure(s):  BILATERAL CERVICAL 3 - CERVICAL 4 AND CERVICAL 4 - CERVICAL 5 ANTERIOR CERVICAL DECOMPRESSION FUSION  4 Days Post-Op  Identification/Summary:   Varun Crowder is a 68 year old male with a PMH of cervical disc disease and alcohol overuse otherwise generally healthy.  At preop blood pressures were elevated.   Denies any prior history of high blood pressure or being on an antihypertensive.  Was cleared for surgery.  10/2 underwent cervical fusion.  Pre and postoperatively patient has been quite hypertensive.  Hospitalist service consulted.  Responding well to lisinopril 5 mg daily.  Having some hyperglycemia and patient now relates a history of prediabetes.  A1c 6.4.  Blood sugars under reasonable control.  However also persistently hypoxic.  Relates a history of heavy snoring.  Utilize incentive spirometry.  Chest CT negative for PE but does show right greater than left atelectasis and some left mainstem bronchus debris.  Speech therapy cleared the patient.  Wheezing improved with DuoNebs.  10/5 early a.m. developed temperature 101.4.  Initiated on Zosyn.  Appreciate pulmonary consultation.  Echocardiogram unremarkable.  Still requiring oxygen.  Cleared by pulmonary orthopedics and general medicine for discharge home.  Will be requiring some supplemental oxygen, DuoNebs, antibiotics and lisinopril for blood pressure.  Recommend close follow-up with primary care provider and outpatient sleep study.    Patient has impaired oxygenation due to aspiration pneumonia. Patient underwent home O2 evaluation on 10/6/2023 andqualified for home O2 due to desaturation to 75% on room air while ambulating. Patient in chronic stable state and requires oxygen therapy upon discharge.  Alternate medications/treatment options were tried and or considered butdeemed ineffective for patient's hypoxia/pneumonia.     Patient  requires home nebulizer machine and all related supplies for aspiration pneumonitis.    Assessment and Plan:     Status post cervical fusion  Dysphagia  Fever  Postoperative management per surgery.  As best I can determine no preoperative laboratory work was done.  Per report did have some swallowing issues  but has improved.  10/5 early a.m. developed temperature of 101.4.  Responded well to Tylenol.  Procalcitonin mildly elevated.  Initiated on Zosyn.  Speech therapy has cleared for swallowing.  Okay to discharge on Augmentin.  Acute blood loss anemia  Postoperative hemoglobin 13.2.  No indication for transfusion at this time.  Follow per protocols.  Acute hypoxemic respiratory failure  Snoring history  Since surgery has been requiring supplemental oxygen.  Possible candidate for undiagnosed sleep apnea.  Admits to a snoring history.  Utilize incentive spirometry.  Failing to wean off of oxygen.    Chest CT negative for pulmonary embolism.  Did show right greater than left atelectasis and left mainstem bronchial debris.  10/4 wheezing noted on exam.  Improved after DuoNebs 4 times daily scheduled.  10/6 still requiring supplemental oxygen.  Likely secondary to significant underlying sleep apnea with combination of his aspiration pneumonia.  Will discharge on home O2 and with DuoNebs.  Recommend formal outpatient sleep study.  Hypertension  Blood pressure pre and postoperatively has consistently been elevated.  Highest reading 183/112.  Preoperative blood pressure was 172/100.  Not chronically on any medications.  Denies a prior history of blood pressure or having recommendations for taking medications for this.  Blood work relatively unremarkable.  Responding well to lisinopril 5 mg daily with reasonable improvement to his blood pressure.  Discharged with lisinopril.  Hydralazine available for severe elevations.  Prediabetes  Hyperglycemia  Postoperative glucose 159.  A1c 6.4.  On further discussion with the  patient he admits to being told he has prediabetes.  Follow blood sugars 4 times a day.  Insulin sliding scale standard range.  Reasonable glucose numbers.  Given printed diabetic diet information.  Alcohol overuse  Regularly drinks 0-8 beverages per day.  Noted.  No evidence for withdrawal.     Anticoagulation.  SCDs ordered by surgery.  Standard postsurgical risk.  COVID-19 PCR unknown  Nurses to screen.  Fluids: Per surgery  Pain meds: Per surgery  Therapy: Per surgery     Martinez:Not present  Lines: None       Current Diet  Orders Placed This Encounter      Discharge Instruction - Regular Diet Adult      Advance Diet as Tolerated: Regular Diet Adult    Supplements  None    Barriers to Discharge: Medically stable for discharge    Disposition: We will be discharging with home oxygen, DuoNebs, lisinopril, Augmentin.  Recommend close follow-up with primary and outpatient sleep study.    Clinically Significant Risk Factors                  # Hypertension: Noted on problem list        # Obesity: Estimated body mass index is 34.44 kg/m  as calculated from the following:    Height as of this encounter: 1.829 m (6').    Weight as of this encounter: 115.2 kg (253 lb 14.4 oz)., PRESENT ON ADMISSION            Interval History/Subjective:  Patient feeling better but does desaturate into the mid 70s on room air.  Denies chest pain nausea or vomiting.  Agreeable to discharging home with home oxygen.  Neck pain under good control.  Questions answered to verbalized satisfaction.      Last 24H PRN:      acetaminophen (TYLENOL) tablet 650 mg, 650 mg at 10/06/23 0515     hydrOXYzine (ATARAX) tablet 10 mg, 10 mg at 10/06/23 0628     magnesium hydroxide (MILK OF MAGNESIA) suspension 30 mL, 30 mL at 10/06/23 0519     oxyCODONE (ROXICODONE) tablet 5 mg **OR** oxyCODONE (ROXICODONE) tablet 10 mg, 10 mg at 10/06/23 1019    Physical Exam/Objective:  Temp:  [98  F (36.7  C)-100  F (37.8  C)] 98.4  F (36.9  C)  Pulse:  [] 92  Resp:   [18] 18  BP: (128-158)/(75-94) 142/94  SpO2:  [86 %-95 %] 93 %  Wt Readings from Last 4 Encounters:   10/05/23 115.2 kg (253 lb 14.4 oz)     Body mass index is 34.44 kg/m .    Constitutional: awake, alert, cooperative, no apparent distress, and appears stated age  ENT: Limited exam secondary to neck brace, Normocephalic, without obvious abnormality, atraumatic, external ears without lesions, oral pharynx with moist mucous membranes, tonsils without erythema or exudates, gums normal and good dentition.  Respiratory: Decreased air movement but no rales rhonchi nor wheezing.  Cardiovascular: Normal apical impulse, regular rate and rhythm, normal S1 and S2, no S3 or S4, and no murmur noted  GI: No scars, normal bowel sounds, soft, non-distended, non-tender, no masses palpated, no hepatosplenomegally  Skin: normal skin color, texture, turgor, no redness, warmth, or swelling, and no rashes  Musculoskeletal: There is no redness, warmth, or swelling of the joints.  Motor strength is 5 out of 5 all extremities bilaterally.  Tone is normal. no lower extremity pitting edema present  Neurologic: Cranial nerves II-XII are grossly intact. Sensory:  Sensory intact  Neuropsychiatric: General: normal, calm, and normal eye contact Level of consciousness: alert / normal Affect: normal Orientation: oriented to self, place, time and situation Memory and insight: normal, memory for past and recent events intact, and thought process normal      Medications:   Personally Reviewed.  Medications       gabapentin  100 mg Oral At Bedtime     insulin aspart  1-7 Units Subcutaneous TID AC     insulin aspart  1-5 Units Subcutaneous At Bedtime     ipratropium - albuterol 0.5 mg/2.5 mg/3 mL  3 mL Nebulization 2 times daily     lisinopril  5 mg Oral Daily     multivitamin, therapeutic  1 tablet Oral Daily     piperacillin-tazobactam  3.375 g Intravenous Q8H     polyethylene glycol  17 g Oral Daily     senna-docusate  1 tablet Oral BID       Data  reviewed today: I personally reviewed all new medications, labs, imaging/diagnostics reports over the past 24 hours. Pertinent findings include:    Imaging:   Recent Results (from the past 24 hour(s))   Echocardiogram Complete    Narrative    [id number]  [id number]  [id number]  [id number] [address]     Name: CORRINE IRIZARRY  MRN: [id number]  : 1955  Study Date: 10/06/2023 11:01 AM  Age: 68 yrs  Gender: Male  Patient Location: [facility]  Reason For Study: SOB, Chest Pain  Ordering Physician: RANCHO FRANKS  Performed By: SABINE     BSA: 2.4 m2  Height: 72 in  Weight: 254 lb  BP: 142/94 mmHg  ______________________________________________________________________________  Procedure  Complete Portable Echo Adult. Definity (NDC #20463-627) given intravenously.  ______________________________________________________________________________  Interpretation Summary     1. Technically difficult study.  2. Normal left ventricular size and systolic performance with a visually  estimated ejection fraction of 60%.  3. No significant valvular heart disease is identified on this study.  4. Probable normal right ventricular size and systolic performance though  right-sided structures are not clearly visualized on all views on this study.  ______________________________________________________________________________  Left ventricle:  Normal left ventricular size and systolic performance with a visually  estimated ejection fraction of 60%. There is normal regional wall motion. Left  ventricular wall thickness is normal.     Assessment of LV Diastolic Function: The cumulative findings suggest normal  diastolic filling [The septal e' velocity is > 7 cm/s & lateral e' velocity  is  < 10 cm/s. The average E/e' is < 14. TR velocity is < 2.8 m/s. Left atrial  volume index is less than 34 mL/mÂ ].     Right ventricle:  Probable normal right ventricular size and systolic  performance though right-  sided structures are not clearly visualized on all views on this study.     Left atrium:  The left atrium is of probable normal size.     Right atrium:  The right atrium is not well visualized.     IVC:  The IVC is of normal caliber.     Aortic valve:  The aortic valve is not well visualized, but suspected to be comprised of  three cusps. No significant aortic stenosis or aortic insufficiency is  detected on this study.     Mitral valve:  The mitral valve appears morphologically normal. There is trace mitral  insufficiency.     Tricuspid valve:  The tricuspid valve is not well visualized. There is suspected trace tricuspid  insufficiency.     Pulmonic valve:  The pulmonic valve is grossly morphologically normal.     Thoracic aorta:  The aortic root and proximal ascending aorta are of normal dimension.     Pericardium:  There is no significant pericardial effusion.  ______________________________________________________________________________  ______________________________________________________________________________  MMode/2D Measurements & Calculations  IVSd: 1.2 cm  LVIDd: 4.7 cm  LVIDs: 3.3 cm  LVPWd: 1.1 cm  FS: 29.0 %  LV mass(C)d: 203.1 grams  LV mass(C)dI: 86.1 grams/m2  Ao root diam: 4.0 cm  LA dimension: 3.1 cm  asc Aorta Diam: 3.5 cm  LA/Ao: 0.79  LVOT diam: 2.7 cm  LVOT area: 5.7 cm2  Ao root diam index Ht(cm/m): 2.2  Ao root diam index BSA (cm/m2): 1.7  Asc Ao diam index BSA (cm/m2): 1.5  Asc Ao diam index Ht(cm/m): 1.9  LA Volume (BP): 47.1 ml     LA Volume Index (BP): 20.0 ml/m2  LA Volume Indexed (AL/bp): 24.1 ml/m2  RV Base: 4.0 cm  RWT: 0.49     TAPSE: 2.7 cm     Time Measurements  MM HR: 99.0 BPM     Doppler Measurements & Calculations  MV E max cathy: 54.4 cm/sec  MV A max cathy: 81.4 cm/sec  MV E/A: 0.67  MV dec time: 0.17 sec  LV V1 max PG: 3.1 mmHg  LV V1 max: 88.7 cm/sec  LV V1 VTI: 14.0 cm  SV(LVOT): 79.3 ml  SI(LVOT): 33.6 ml/m2  PA acc time: 0.10 sec  TR max cathy:  267.0 cm/sec  TR max P.5 mmHg  E/E' av.8  Lateral E/e': 5.6  Medial E/e': 6.0  RV S Lance: 22.2 cm/sec     ______________________________________________________________________________  Report approved by: Ross Oh 10/06/2023 12:21 PM             Labs:  Echocardiogram Complete   Final Result      CT Chest Pulmonary Embolism w Contrast   Final Result   IMPRESSION:   1.  No pulmonary embolism or acute aortic pathology.   2.  Right greater than left lower lobe atelectasis.      XR Surgery DIALLO  Fluoro G/T 5 Min   Final Result        Recent Results (from the past 24 hour(s))   Glucose by meter    Collection Time: 10/05/23  4:32 PM   Result Value Ref Range    GLUCOSE BY METER POCT 114 (H) 70 - 99 mg/dL   Glucose by meter    Collection Time: 10/05/23  9:21 PM   Result Value Ref Range    GLUCOSE BY METER POCT 127 (H) 70 - 99 mg/dL   Glucose by meter    Collection Time: 10/06/23  6:21 AM   Result Value Ref Range    GLUCOSE BY METER POCT 125 (H) 70 - 99 mg/dL   Magnesium    Collection Time: 10/06/23  9:35 AM   Result Value Ref Range    Magnesium 2.5 (H) 1.7 - 2.3 mg/dL   Blood gas venous    Collection Time: 10/06/23  9:35 AM   Result Value Ref Range    pH Venous 7.41 7.35 - 7.45    pCO2 Venous 55 (H) 35 - 50 mm Hg    pO2 Venous 47 25 - 47 mm Hg    Bicarbonate Venous 35 (H) 24 - 30 mmol/L    Base Excess/Deficit 9.8   mmol/L    Oxyhemoglobin Venous 82.6 (H) 70.0 - 75.0 %    O2 Sat, Venous 84.0 (H) 70.0 - 75.0 %   Comprehensive metabolic panel    Collection Time: 10/06/23  9:35 AM   Result Value Ref Range    Sodium 136 135 - 145 mmol/L    Potassium 4.3 3.4 - 5.3 mmol/L    Carbon Dioxide (CO2) 32 (H) 22 - 29 mmol/L    Anion Gap 8 7 - 15 mmol/L    Urea Nitrogen 14.9 8.0 - 23.0 mg/dL    Creatinine 0.60 (L) 0.67 - 1.17 mg/dL    GFR Estimate >90 >60 mL/min/1.73m2    Calcium 9.1 8.8 - 10.2 mg/dL    Chloride 96 (L) 98 - 107 mmol/L    Glucose 113 (H) 70 - 99 mg/dL    Alkaline Phosphatase 93 40 - 129 U/L    AST 35  0 - 45 U/L    ALT 25 0 - 70 U/L    Protein Total 7.6 6.4 - 8.3 g/dL    Albumin 3.8 3.5 - 5.2 g/dL    Bilirubin Total 0.5 <=1.2 mg/dL   Troponin T, High Sensitivity    Collection Time: 10/06/23  9:35 AM   Result Value Ref Range    Troponin T, High Sensitivity 19 <=22 ng/L   N terminal pro BNP outpatient    Collection Time: 10/06/23  9:35 AM   Result Value Ref Range    N Terminal Pro BNP Outpatient 75 0 - 900 pg/mL   CBC with platelets and differential    Collection Time: 10/06/23  9:35 AM   Result Value Ref Range    WBC Count 9.2 4.0 - 11.0 10e3/uL    RBC Count 4.53 4.40 - 5.90 10e6/uL    Hemoglobin 13.3 13.3 - 17.7 g/dL    Hematocrit 41.1 40.0 - 53.0 %    MCV 91 78 - 100 fL    MCH 29.4 26.5 - 33.0 pg    MCHC 32.4 31.5 - 36.5 g/dL    RDW 13.9 10.0 - 15.0 %    Platelet Count 273 150 - 450 10e3/uL    % Neutrophils 76 %    % Lymphocytes 11 %    % Monocytes 10 %    Mids % (Monos, Eos, Basos)      % Eosinophils 3 %    % Basophils 0 %    % Immature Granulocytes 0 %    NRBCs per 100 WBC 0 <1 /100    Absolute Neutrophils 7.0 1.6 - 8.3 10e3/uL    Absolute Lymphocytes 1.0 0.8 - 5.3 10e3/uL    Absolute Monocytes 0.9 0.0 - 1.3 10e3/uL    Mids Abs (Monos, Eos, Basos)      Absolute Eosinophils 0.3 0.0 - 0.7 10e3/uL    Absolute Basophils 0.0 0.0 - 0.2 10e3/uL    Absolute Immature Granulocytes 0.0 <=0.4 10e3/uL    Absolute NRBCs 0.0 10e3/uL       Pending Labs:  Unresulted Labs Ordered in the Past 30 Days of this Admission       Date and Time Order Name Status Description    10/5/2023  4:11 AM Blood Culture Peripheral Blood Preliminary     10/5/2023  4:11 AM Blood Culture Peripheral Blood Preliminary               Mulugeta Fernandez MD  [facility]  Phone: #[phone]

## 2023-10-06 NOTE — PLAN OF CARE
Goal Outcome Evaluation:  Pt is alert & pleasant & able to make needs known. Pt has coarse lung sounds, particularly in his right lung. Pt's SaO2 level on room air at rest was in the mid to upper 80's. I conducted a Home O2 assessment (see note) and he qualifies. I contacted [facility] medical to begin making arrangements for home O2. Pt was also advised by the hospitalist to follow up with his primary MD, and that he'll likely need a sleep study to assess for sleep apnea. Pt was agreeable to this information.   Home medical delivered the O2 supplies. Pt states his daughter won't be able to get him until approximately 1700 this evening. Report given to oncoming Rn

## 2023-10-06 NOTE — PROGRESS NOTES
LUNG NODULE & INTERVENTIONAL PULMONARY CLINIC  [facility], [facility]     Varun Crowder MRN# [id number]   Age: 68 year old YOB: 1955       Requesting Physician: No referring provider defined for this encounter.       Assessment and Plan:    1.  Aspiration pneumonia in the setting of recent surgery.  Clinically improving.  Can complete 7 days of antibiotics.  Okay to change to Augmentin.    2.  Acute hypoxic respiratory failure.  In the setting of atelectasis, low lung volumes, mild pneumonia, possible component of hypoventilation syndrome.  Mobilize proactively with PT-OT according to postop protocols.  Goal oxygen saturation 89% at rest.    3.  Clinical concern for obstructive sleep apnea.  He should have an outpatient sleep study and sleep referral.    We will sign off.  Please contact us with any questions.             History:     Varun Crowder is a 68 year old male with sig h/o for recent neck surgery who is here for evaluation/followup of aspiration pneumonia.  He is feeling a little bit better.  Main issue is being sore at the surgical site.  No fevers.  Coughing up a little bit of sputum.      - My interpretation of the images relevant for this visit includes: CT scan with low lung volumes and some degree of atelectasis and possible pneumonia.             Past Medical History:    History reviewed. No pertinent past medical history.        Past Surgical History:      Past Surgical History:   Procedure Laterality Date    FUSION CERVICAL ANTERIOR TWO LEVELS Bilateral 10/2/2023    Procedure: BILATERAL CERVICAL 3 - CERVICAL 4 AND CERVICAL 4 - CERVICAL 5 ANTERIOR CERVICAL DECOMPRESSION FUSION;  Surgeon: Tremaine Curz MD;  Location: [facility] Main OR    ORTHOPEDIC SURGERY Right     Shoulder tendon repair          Social History:     Social History     Tobacco Use    Smoking status: Former     Types: Cigarettes    Smokeless tobacco: Never    Substance Use Topics    Alcohol use: Yes     Comment: 3 times a week, few beers          Family History:   History reviewed. No pertinent family history.        Allergies:    No Known Allergies       Medications:     Current Facility-Administered Medications   Medication    acetaminophen (TYLENOL) tablet 650 mg    benzocaine-menthol (CEPACOL) 15-3.6 MG lozenge 1 lozenge    bisacodyl (DULCOLAX) suppository 10 mg    glucose gel 15-30 g    Or    dextrose 50 % injection 25-50 mL    Or    glucagon injection 1 mg    gabapentin (NEURONTIN) capsule 100 mg    hydrALAZINE (APRESOLINE) injection 10 mg    HYDROmorphone (DILAUDID) injection 0.2 mg    Or    HYDROmorphone (DILAUDID) injection 0.4 mg    hydrOXYzine (ATARAX) tablet 10 mg    insulin aspart (NovoLOG) injection (RAPID ACTING)    insulin aspart (NovoLOG) injection (RAPID ACTING)    ipratropium - albuterol 0.5 mg/2.5 mg/3 mL (DUONEB) neb solution 3 mL    lisinopril (ZESTRIL) tablet 5 mg    loratadine (CLARITIN) tablet 10 mg    magnesium hydroxide (MILK OF MAGNESIA) suspension 30 mL    multivitamin, therapeutic (THERA-VIT) tablet 1 tablet    naloxone (NARCAN) injection 0.2 mg    Or    naloxone (NARCAN) injection 0.4 mg    Or    naloxone (NARCAN) injection 0.2 mg    Or    naloxone (NARCAN) injection 0.4 mg    ondansetron (ZOFRAN ODT) ODT tab 4 mg    Or    ondansetron (ZOFRAN) injection 4 mg    oxyCODONE (ROXICODONE) tablet 5 mg    Or    oxyCODONE (ROXICODONE) tablet 10 mg    oxyCODONE (ROXICODONE) tablet 5 mg    piperacillin-tazobactam (ZOSYN) 3.375 g vial to attach to  mL bag    polyethylene glycol (MIRALAX) Packet 17 g    prochlorperazine (COMPAZINE) injection 5 mg    prochlorperazine (COMPAZINE) tablet 5 mg    senna-docusate (SENOKOT-S/PERICOLACE) 8.6-50 MG per tablet 1 tablet          Review of Systems:     See HPI         Physical Exam:   BP (!) 142/94 (BP Location: Right arm)   Pulse 92   Temp 98.4  F (36.9  C) (Oral)   Resp 18   Ht 1.829 m (6')   Wt  115.2 kg (253 lb 14.4 oz)   SpO2 93%   BMI 34.44 kg/m      Constitutional -mildly uncomfortable, neck brace in place  Eyes - no redness or discharge  Respiratory -breathing appears comfortable. No wheeze or rhonchi.   Cardiac -- Normal rate, rhythm.   Skin - No appreciable discoloration or lesions (very limited exam)  Neurological - No apparent tremors. Speech fluent and articulate  Psychiatric - no signs of delirium or anxiety          Current Laboratory Data:   All laboratory and imaging data reviewed.    Results for orders placed or performed during the hospital encounter of 10/02/23 (from the past 24 hour(s))   Glucose by meter   Result Value Ref Range    GLUCOSE BY METER POCT 107 (H) 70 - 99 mg/dL   Glucose by meter   Result Value Ref Range    GLUCOSE BY METER POCT 114 (H) 70 - 99 mg/dL   Glucose by meter   Result Value Ref Range    GLUCOSE BY METER POCT 127 (H) 70 - 99 mg/dL   Glucose by meter   Result Value Ref Range    GLUCOSE BY METER POCT 125 (H) 70 - 99 mg/dL   Magnesium   Result Value Ref Range    Magnesium 2.5 (H) 1.7 - 2.3 mg/dL   Blood gas venous   Result Value Ref Range    pH Venous 7.41 7.35 - 7.45    pCO2 Venous 55 (H) 35 - 50 mm Hg    pO2 Venous 47 25 - 47 mm Hg    Bicarbonate Venous 35 (H) 24 - 30 mmol/L    Base Excess/Deficit 9.8   mmol/L    Oxyhemoglobin Venous 82.6 (H) 70.0 - 75.0 %    O2 Sat, Venous 84.0 (H) 70.0 - 75.0 %   CBC with Platelets & Differential    Narrative    The following orders were created for panel order CBC with Platelets & Differential.  Procedure                               Abnormality         Status                     ---------                               -----------         ------                     CBC with platelets and d...[[id number]]                      Final result                 Please view results for these tests on the individual orders.   Comprehensive metabolic panel   Result Value Ref Range    Sodium 136 135 - 145 mmol/L    Potassium 4.3 3.4 - 5.3  mmol/L    Carbon Dioxide (CO2) 32 (H) 22 - 29 mmol/L    Anion Gap 8 7 - 15 mmol/L    Urea Nitrogen 14.9 8.0 - 23.0 mg/dL    Creatinine 0.60 (L) 0.67 - 1.17 mg/dL    GFR Estimate >90 >60 mL/min/1.73m2    Calcium 9.1 8.8 - 10.2 mg/dL    Chloride 96 (L) 98 - 107 mmol/L    Glucose 113 (H) 70 - 99 mg/dL    Alkaline Phosphatase 93 40 - 129 U/L    AST 35 0 - 45 U/L    ALT 25 0 - 70 U/L    Protein Total 7.6 6.4 - 8.3 g/dL    Albumin 3.8 3.5 - 5.2 g/dL    Bilirubin Total 0.5 <=1.2 mg/dL   Troponin T, High Sensitivity   Result Value Ref Range    Troponin T, High Sensitivity 19 <=22 ng/L   N terminal pro BNP outpatient   Result Value Ref Range    N Terminal Pro BNP Outpatient 75 0 - 900 pg/mL   CBC with platelets and differential   Result Value Ref Range    WBC Count 9.2 4.0 - 11.0 10e3/uL    RBC Count 4.53 4.40 - 5.90 10e6/uL    Hemoglobin 13.3 13.3 - 17.7 g/dL    Hematocrit 41.1 40.0 - 53.0 %    MCV 91 78 - 100 fL    MCH 29.4 26.5 - 33.0 pg    MCHC 32.4 31.5 - 36.5 g/dL    RDW 13.9 10.0 - 15.0 %    Platelet Count 273 150 - 450 10e3/uL    % Neutrophils 76 %    % Lymphocytes 11 %    % Monocytes 10 %    Mids % (Monos, Eos, Basos)      % Eosinophils 3 %    % Basophils 0 %    % Immature Granulocytes 0 %    NRBCs per 100 WBC 0 <1 /100    Absolute Neutrophils 7.0 1.6 - 8.3 10e3/uL    Absolute Lymphocytes 1.0 0.8 - 5.3 10e3/uL    Absolute Monocytes 0.9 0.0 - 1.3 10e3/uL    Mids Abs (Monos, Eos, Basos)      Absolute Eosinophils 0.3 0.0 - 0.7 10e3/uL    Absolute Basophils 0.0 0.0 - 0.2 10e3/uL    Absolute Immature Granulocytes 0.0 <=0.4 10e3/uL    Absolute NRBCs 0.0 10e3/uL

## 2023-10-06 NOTE — PROVIDER NOTIFICATION
"RM [id number]     went to assess patient, tachycardia, temperature 100.0 F and required more O2, 2.5 to 3 L, patient also reported had chest pain \"a couple hours ago,\" denied it currently, asymptomatic. please advise.       Dr. Arthur ordered AM labs.   "

## 2023-10-06 NOTE — DISCHARGE SUMMARY
ORTHOPEDIC DISCHARGE SUMMARY       Varun Crowder,  1955, MRN [id number]    Admission Date: 10/2/2023      Admission Diagnoses: Cervical radicular pain [M54.12]  Spinal stenosis of cervical region [M48.02]  Lumbar spinal stenosis [M48.061]     Discharge Date:  10/06/23     Post-operative Day:  4 Days Post-Op    Reason for Admission: The patient was admitted for the following: Procedure(s):  BILATERAL CERVICAL 3 - CERVICAL 4 AND CERVICAL 4 - CERVICAL 5 ANTERIOR CERVICAL DECOMPRESSION FUSION    BRIEF HOSPITAL COURSE   Varun Crowder is a pleasant 68 year old male who underwent the aforementioned procedure with Dr. Cruz on 10/2/23. There were no intraoperative complications and the patient was transferred to the recovery room and later the orthopedic unit in stable condition. Once the patient reached the orthopedic floor our orthopedic pain protocol was implemented along with the following:    Anticoagulation Medications: None  Therapy: PT and OT  Activity: WBAT  Bracing: None    Consultations during Admission: Hospitalist service for medical management     COMPLICATIONS/SIGNIFICANT FINDINGS    New diagnosis of SILVIA after needing significant O2 during the day maxing out at 3.5L. Pulm saw and recommended sleep study. CT PE and CXR negative for PE but did show some debris in the bronchus.     New HTN now on antihypertensives    New prediabetes education provided and insulin given while in the hospital    DISCHARGE INFORMATION   Condition at discharge: Good  Discharge destination: Home with home cares  Patient was seen by myself on the date of discharge.    FOLLOW UP CARE   Follow up with orthopedics in 2 weeks or sooner should the need arise. Ortho will continue to manage pain control, post op anticoagulation and incision care.     Follow up with your PCP for management of chronic medical problems and to evaluate for post op medical complications including constipation, nausea/vomiting, DVT/PE, anemia, changes in  blood pressure, fevers/chills, urinary retention and atelectasis/pneumonia.     Subjective   Patient is doing well on POD #1. Pain is well controlled with oral medications. Ambulating. Tolerating oral intake.     Physical Exam   BP (!) 142/94 (BP Location: Right arm)   Pulse 92   Temp 98.4  F (36.9  C) (Oral)   Resp 18   Ht 1.829 m (6')   Wt 115.2 kg (253 lb 14.4 oz)   SpO2 93%   BMI 34.44 kg/m    The patient is A&Ox3. Appears comfortable.   Sensation is intact.  Able to make full fist, oppose fingers to thumb and give thumbs up okay sign  Radial pulse intact.  Calves are soft and non-tender. Negative Willard's.  The incision is covered. Dressing C/D/I.    Pertinent Results at Discharge     Hemoglobin   Date/Time Value Ref Range Status   10/06/2023 09:35 AM 13.3 13.3 - 17.7 g/dL Final   10/05/2023 04:31 AM 12.9 (L) 13.3 - 17.7 g/dL Final   10/03/2023 09:41 AM 13.2 (L) 13.3 - 17.7 g/dL Final     Platelet Count   Date/Time Value Ref Range Status   10/06/2023 09:35  150 - 450 10e3/uL Final   10/05/2023 04:31  150 - 450 10e3/uL Final       Problem List   Principal Problem:    Lumbar spinal stenosis  Active Problems:    HTN (hypertension)    Alcohol use, unspecified, uncomplicated    Prediabetes    Snoring    Acute hypoxemic respiratory failure (H)    Fever      Eleonora Colindres PA-C/Dr. Cruz  [facility]  [phone]  Date: 10/6/2023  Time: 12:33 PM

## 2023-10-06 NOTE — PLAN OF CARE
"Patient vital signs are at baseline: No,  Reason:  had a slight fever 100.0 F, after tylenol, recheck was 98.2 F, also tachycardic, denied symptoms, increased O2 from 2.5 L to 3 L, O2 saturation low 90s, reported mild SOB at rest   Patient able to ambulate as they were prior to admission or with assist devices provided by therapies during their stay:  Yes  Patient MUST void prior to discharge:  Yes  Patient able to tolerate oral intake:  Yes  Pain has adequate pain control using Oral analgesics:  Yes  Does patient have an identified :  Yes  Has goal D/C date and time been discussed with patient:  Yes     Goal Outcome Evaluation:      Plan of Care Reviewed With: patient    Overall Patient Progress: no changeOverall Patient Progress: no change    Alert and oriented x4, able to make needs known, CMS intact, dsg CDI, soft collar in place, slept comfortably throughout the night, encouraged IS use, reported chest pain \"a couple hours ago,\" notified MD, AM labs were ordered, on K and Mg protocols, Zosyn currently running, reported constipation, bowel sounds present, denied discomfort, gave MOM.     "

## 2023-10-06 NOTE — PROGRESS NOTES
Care Management Discharge Note    Discharge Date: 10/06/2023       Discharge Disposition: Home, Home Care    Discharge Services: None    Discharge DME: None    Discharge Transportation:      Private pay costs discussed: Not applicable    Does the patient's insurance plan have a 3 day qualifying hospital stay waiver?  No    PAS Confirmation Code: N/A  Patient/family educated on Medicare website which has current facility and service quality ratings: no    Education Provided on the Discharge Plan: Yes  Persons Notified of Discharge Plans:   Patient/Family in Agreement with the Plan: yes    Handoff Referral Completed: No    Additional Information:  Met with pt to discuss recommendation for home care (RN) at discharge.  Pt is in agreement with home care.  Referral made to [facility].  12:54 PM     Pt accepted by [facility] for home RN.  1:37 PM     TAHIR Mercedes

## 2023-10-06 NOTE — PROGRESS NOTES
Patient has been assessed for Home Oxygen needs. Oxygen readings:    *Pulse oximetry (SpO2) = 86% on room air at rest while awake.    *SpO2 improved to 92% on 3liters/minute at rest.    *SpO2 = 73% on room air during activity/with exercise.    *SpO2 improved to 93% on 4liters/minute during activity/with exercise.    I conducted a Home O2 assessment for this patient. His SaO2 levels dropped into the mid/low 70's on room air with activity. With O2 (4 L nc) AND coaching to breathe through the nose where the supplemental O2 was coming from, he was able to recover into the low 90's while still performing light activity..

## 2023-10-06 NOTE — PROGRESS NOTES
Informed of chest pain, low grade fever, O2 up to 3L. PE study negative    Plans - Check VBG, trop, CBC, BNP

## 2023-10-06 NOTE — PROGRESS NOTES
Discharge instructions and education provided to pt including stop light tool. Pt acknowledge understanding and had no questions for nurse. Pt is being discharge to home via family transport.

## 2023-10-06 NOTE — PROGRESS NOTES
Orthopedic Progress Note      Assessment: 3 Day Post-Op  S/P Procedure(s):  BILATERAL CERVICAL 3 - CERVICAL 4 AND CERVICAL 4 - CERVICAL 5 ANTERIOR CERVICAL DECOMPRESSION FUSION [facility]    Plan:   - Continue PT/OT  - Weightbearing status: WBAT can use brace for comfort  - No bending, twisting or lifting more than 10 pounds for 6 weeks.  - Anticoagulation: no chemical prophylaxis in addition to SCDs, felisha stockings and early ambulation.  - Pain control at discharge: Oxycodone 5 mg 1-2 tabs Q4-6 hours x30-32 tabs, Hydroxyzine 25 mg QID PRN, Gabapentin 100 mg TID , Tizanidine 4 mg TID, Acetaminophen 1000 mg TID  - Discharge planning: pending medical clearance        Subjective:  Pain: mild  Chest pain, SOB: no  Nausea, Vomiting:  no  Lightheadedness, Dizziness:  no  Neuro:  Patient denies new onset numbness or paresthesias    Patient is doing well postop day 4.  Ambulating, tolerating oral intake. Patient was cleared by speech therapy.  Pain is under good control.  Patient still requiring high amounts of nasal cannula oxygen as well as complained of chest pain last night troponin and other labs were ordered by the overnight hospitalist.  So far these are negative.  Spoke with the hospitalist this morning going to get pulmonary input on next steps.  Patient's discharge is pending medical clearance.     Objective:  BP (!) 142/94 (BP Location: Right arm)   Pulse 92   Temp 98.4  F (36.9  C) (Oral)   Resp 18   Ht 1.829 m (6')   Wt 115.2 kg (253 lb 14.4 oz)   SpO2 93%   BMI 34.44 kg/m    The patient is A&Ox3. Appears comfortable.   Sensation is intact.  Able to make full fist, oppose fingers to thumb and give thumbs up okay sign  Radial pulse intact.  Calves are soft and non-tender. Negative Willard's.  The incision is covered. Dressing C/D/I.      Pertinent Labs   Lab Results: personally reviewed.   No results found for: INR, PROTIME  Lab Results   Component Value Date    WBC 9.2 10/06/2023    HGB 13.3 10/06/2023     HCT 41.1 10/06/2023    MCV 91 10/06/2023     10/06/2023     Lab Results   Component Value Date     10/06/2023    CO2 32 (H) 10/06/2023         Report completed by:  Eleonora Colindres PA-C /Dr. Walker  [facility]  10/06/23

## 2023-10-06 NOTE — DISCHARGE INSTRUCTIONS
Home Care Has been arranged with  [facility]  [phone]  They will contact you to arrange initial visit

## 2023-10-10 LAB
BACTERIA BLD CULT: NO GROWTH
BACTERIA BLD CULT: NO GROWTH

## 2023-10-26 DIAGNOSIS — G47.33 OSA (OBSTRUCTIVE SLEEP APNEA): Primary | ICD-10-CM

## (undated) DEVICE — DRAPE C-ARM 60X42" 1013

## (undated) DEVICE — TAPE ADH POROUS 3IN CURITY STD 7046C

## (undated) DEVICE — PACK MINOR SINGLE BASIN SSK3001

## (undated) DEVICE — DRSG STERI STRIP 1/2X4" R1547

## (undated) DEVICE — SPONGE KITNER DISSECTING 7102*

## (undated) DEVICE — TOOL DISSECT MIDAS MR8 14CM MATCH HEAD 3MM MR8-14MH30

## (undated) DEVICE — SUTURE VICRYL+ 2-0 CT-2 27" UND VCP269H

## (undated) DEVICE — CATH IV 14GA 2IN REM FLASHPLUG DEHP-FR PVC FR 4251717-02

## (undated) DEVICE — DRSG PRIMAPORE 03 1/8X6" 66000318

## (undated) DEVICE — DRAPE THYROID

## (undated) DEVICE — GLOVE UNDER INDICATOR PI SZ 8.5 LF 41685

## (undated) DEVICE — SUTURE VICRYL+ 1 27IN CT-2 VLT VCP335H

## (undated) DEVICE — SUCTION MANIFOLD NEPTUNE 2 SYS 1 PORT 702-025-000

## (undated) DEVICE — SOL NACL 0.9% IRRIG 1000ML BOTTLE 2F7124

## (undated) DEVICE — PLATE GROUNDING ADULT W/CORD 9165L

## (undated) DEVICE — GOWN IMPERVIOUS BREATHABLE 2XL/XLONG

## (undated) DEVICE — ESU PENCIL SMOKE EVAC W/ROCKER SWITCH 0703-047-000

## (undated) DEVICE — SOL WATER IRRIG 1000ML BOTTLE 2F7114

## (undated) DEVICE — GLOVE BIOGEL PI ORTHOPRO SZ 8.5 47685

## (undated) DEVICE — Device

## (undated) DEVICE — ESU ELEC BLADE 2.75" COATED/INSULATED E1455

## (undated) DEVICE — CUSTOM PACK LUMBAR FUSION SNE5BLFHEA

## (undated) DEVICE — GLOVE SURG PI ULTRA TOUCH M SZ 8-1/2 LF

## (undated) DEVICE — SUTURE VICRYL+ 3-0 18IN PS-2 UND VCP497H

## (undated) DEVICE — DRAPE BACK TABLE PADDED 60X90

## (undated) DEVICE — PREP CHLORAPREP W/ORANGE TINT 10.5ML 930715

## (undated) DEVICE — GLOVE BIOGEL PI INDICATOR 9.0 LF  41690

## (undated) RX ORDER — PROPOFOL 10 MG/ML
INJECTION, EMULSION INTRAVENOUS
Status: DISPENSED
Start: 2023-10-02

## (undated) RX ORDER — FENTANYL CITRATE 50 UG/ML
INJECTION, SOLUTION INTRAMUSCULAR; INTRAVENOUS
Status: DISPENSED
Start: 2023-10-02

## (undated) RX ORDER — ONDANSETRON 2 MG/ML
INJECTION INTRAMUSCULAR; INTRAVENOUS
Status: DISPENSED
Start: 2023-10-02

## (undated) RX ORDER — LIDOCAINE HYDROCHLORIDE 10 MG/ML
INJECTION, SOLUTION EPIDURAL; INFILTRATION; INTRACAUDAL; PERINEURAL
Status: DISPENSED
Start: 2023-10-02

## (undated) RX ORDER — GLYCOPYRROLATE 0.2 MG/ML
INJECTION, SOLUTION INTRAMUSCULAR; INTRAVENOUS
Status: DISPENSED
Start: 2023-10-02